# Patient Record
Sex: MALE | Race: WHITE | NOT HISPANIC OR LATINO | Employment: FULL TIME | URBAN - METROPOLITAN AREA
[De-identification: names, ages, dates, MRNs, and addresses within clinical notes are randomized per-mention and may not be internally consistent; named-entity substitution may affect disease eponyms.]

---

## 2018-07-17 PROBLEM — R20.0 NUMBNESS OF RIGHT HAND: Status: ACTIVE | Noted: 2018-07-17

## 2018-07-17 PROBLEM — M77.11 RIGHT LATERAL EPICONDYLITIS: Status: ACTIVE | Noted: 2018-07-17

## 2019-02-13 ENCOUNTER — INITIAL CONSULT (OUTPATIENT)
Dept: PHYSICAL MEDICINE AND REHAB | Facility: CLINIC | Age: 44
End: 2019-02-13
Payer: COMMERCIAL

## 2019-02-13 VITALS
DIASTOLIC BLOOD PRESSURE: 81 MMHG | HEIGHT: 69 IN | SYSTOLIC BLOOD PRESSURE: 152 MMHG | WEIGHT: 175 LBS | BODY MASS INDEX: 25.92 KG/M2 | HEART RATE: 62 BPM

## 2019-02-13 DIAGNOSIS — M77.11 RIGHT LATERAL EPICONDYLITIS: Primary | ICD-10-CM

## 2019-02-13 PROCEDURE — 99999 PR PBB SHADOW E&M-NEW PATIENT-LVL III: CPT | Mod: PBBFAC,,, | Performed by: PHYSICAL MEDICINE & REHABILITATION

## 2019-02-13 PROCEDURE — 76882 PR  US,EXTREMITY,NONVASCULAR,REAL-TIME IMAGE,LIMITED: ICD-10-PCS | Mod: S$GLB,,, | Performed by: PHYSICAL MEDICINE & REHABILITATION

## 2019-02-13 PROCEDURE — 99243 OFF/OP CNSLTJ NEW/EST LOW 30: CPT | Mod: 25,S$GLB,, | Performed by: PHYSICAL MEDICINE & REHABILITATION

## 2019-02-13 PROCEDURE — 99999 PR PBB SHADOW E&M-NEW PATIENT-LVL III: ICD-10-PCS | Mod: PBBFAC,,, | Performed by: PHYSICAL MEDICINE & REHABILITATION

## 2019-02-13 PROCEDURE — 76882 US LMTD JT/FCL EVL NVASC XTR: CPT | Mod: S$GLB,,, | Performed by: PHYSICAL MEDICINE & REHABILITATION

## 2019-02-13 PROCEDURE — 99243 PR OFFICE CONSULTATION,LEVEL III: ICD-10-PCS | Mod: 25,S$GLB,, | Performed by: PHYSICAL MEDICINE & REHABILITATION

## 2019-02-13 NOTE — PROGRESS NOTES
[facility]    Consulting Provider: Dr. Eugene Iqbal II    CHIEF COMPLAINT:   Chief Complaint   Patient presents with    Elbow Pain     right elbow pain     HISTORY OF PRESENT ILLNESS: Matty Aquino is a 43 y.o. male who presents to me for right elbow pain. He has has right elbow pain for about 1 year. He does not remember any inciting incident and truma. He has an extensive history of being very active in sports (ju jitzu, and boxing) and with his job as well. He used to work in maintenance. He had previous injection of his right elbow in 7/31/18 and 1/19/19. He also did about 6 weeks of physical therapy with benefit. The first shot lasted about 4-5 months. His shot in January 2019 has not been as affective. His pain has been progressively increasing. He takes Ibuprofen 800mg 2-3 times a week. He has issues with opening bottles. Any movement that extends the wrist causes him pain. Patient brought MRI disc that showed lateral epicondylitis, including a partial tear of the common extensor tendon origin. He wants to discuss therapeutic options or procedures that could help him avoid surgery and provide him with pain relief.    Review of Systems   Constitutional: Negative for fever.   HENT: Negative for drooling.    Eyes: Negative for discharge.   Respiratory: Negative for choking.    Cardiovascular: Negative for chest pain.   Genitourinary: Negative for flank pain.   Skin: Negative for wound.   Allergic/Immunologic: Negative for immunocompromised state.   Neurological: Negative for tremors and syncope.   Psychiatric/Behavioral: Negative for behavioral problems.     Past Medical History:   Past Medical History:   Diagnosis Date    Asthma     Kidney stone     (R) x 2       Past Surgical History:   Past Surgical History:   Procedure Laterality Date    APPENDECTOMY  1989    HAND SURGERY Right     ORIF    METACARPAL HARDWARE REMOVAL Right     TONSILLECTOMY         Family History:  "History reviewed. No pertinent family history.    Medications:   Current Outpatient Medications on File Prior to Visit   Medication Sig Dispense Refill    ibuprofen (ADVIL,MOTRIN) 800 MG tablet Take 1 tablet (800 mg total) by mouth every 8 (eight) hours as needed for Pain. 30 tablet 1    testosterone 1 % (25 mg/2.5gram) GlPk Place onto the skin every 14 (fourteen) days.       VENTOLIN HFA 90 mcg/actuation inhaler Inhale 2 puffs into the lungs 4 (four) times daily as needed.  4     No current facility-administered medications on file prior to visit.        Allergies:   Review of patient's allergies indicates:   Allergen Reactions    Pcn [penicillins]      As a child       Social History:   Social History     Socioeconomic History    Marital status:      Spouse name: None    Number of children: None    Years of education: None    Highest education level: None   Social Needs    Financial resource strain: None    Food insecurity - worry: None    Food insecurity - inability: None    Transportation needs - medical: None    Transportation needs - non-medical: None   Occupational History    None   Tobacco Use    Smoking status: Current Every Day Smoker     Packs/day: 1.00     Years: 25.00     Pack years: 25.00    Smokeless tobacco: Never Used   Substance and Sexual Activity    Alcohol use: No    Drug use: No    Sexual activity: None   Other Topics Concern    None   Social History Narrative    None     PHYSICAL EXAMINATION:   General    Vitals:    02/13/19 1105   BP: (!) 152/81   Pulse: 62   Weight: 79.4 kg (175 lb)   Height: 5' 9" (1.753 m)     Constitutional: Oriented to person, place, and time. No apparent distress. Appears well-developed and well-nourished. Pleasant.  HENT:   Head: Normocephalic and atraumatic.   Eyes: Right eye exhibits no discharge. Left eye exhibits no discharge. No scleral icterus.   Pulmonary/Chest: Effort normal. No respiratory distress.   Abdominal: There is no " guarding.   Neurological: Alert and oriented to person, place, and time.   Psychiatric: Behavior is normal.   Right Elbow Exam     Tenderness   The patient is experiencing tenderness in the lateral epicondyle.     Range of Motion   Extension: 0 (pain with terminal extension)   Flexion: 140     Muscle Strength   Pronation:  5/5   Supination:  5/5     Tests   Varus: negative (Pain with varus pressure)  Valgus: negative    Other   Erythema: absent  Scars: absent  Sensation: normal  Pulse: present    Comments:  + Cozen's test      Left Elbow Exam     Tenderness   The patient is experiencing no tenderness.     Range of Motion   Extension: normal   Flexion: normal   Pronation: normal   Supination: normal     Other   Erythema: absent  Scars: absent  Sensation: normal  Pulse: present        INSPECTION: There is no swelling, ecchymoses, erythema or gross deformity of the elbow.  LIGAMENTOUS LAXITY AND STABILITY: No laxity or instability appreciated with varus/valgus pressure, but there was lateral pain with varus.    Imaging:    EXAMINATION:  XR ELBOW COMPLETE 3 VIEW RIGHT    CLINICAL HISTORY:  . Pain in right elbow    TECHNIQUE:  AP, lateral, and oblique views of the right elbow were performed.    COMPARISON:  None    FINDINGS:  There are no osseous or articular abnormalities.  No fractures or dislocations or joint effusion.  Soft tissues are unremarkable.      Impression       As above.      Electronically signed by: Migue Tovar MD  Date: 07/17/2018  Time: 10:15     Diagnostic ultrasound exam  Limited diagnostic exam of the right lateral elbow was performed today.  The images were saved will be uploaded to EMR.  There was no lateral elbow joint effusion.  The proximal/posterior portion of the radial collateral ligament was somewhat attenuated and hypoechoic, suggestive of some partial tearing of the deep fibers.  Otherwise, the ligament appeared to be within normal limits.  The above common extensor tendon was heterogenous  "in echotexture at the proximal and anterior portions of the tendon.  There were some hypoechoic areas within the tendon and may represent some intrasubstance tearing.  Color Doppler function show a mild degree of hyperemia/neovascularization within the proximal tendon substance.  This area was tender to sonopalpation.    Data Reviewed: X-ray, ulrasound    Supportive Actions: Independent visualization of images or test specimens    ASSESSMENT:   Lateral epicondylitis, right    PLAN:     1. Time was spent reviewing the above diagnosis in depth with Matty gomez, including acute management and rehabilitation. Therapeutic and interventional options were discussed with the patient. He understood his options; conservative to more aggressive.  He has failed conservative management in the form of NSAIDs, rest, physical therapy, and corticosteroid injections.  Discussed PRP, Stem cells, and Tenex procedure. Patient understood the procedure including risks and benefits. He wants to proceed with the Tenex procedure.  We discussed that the presence of some tendon tearing does lower the prognosis, however he does have pathologic degenerative tendon tissue that we could address with the Tenex device.  We discussed the likelihood of increased pain and soreness following the Tenex procedure followed by gradual course of improvement for several weeks to months.  We discussed that his activities will be limited, especially for the 1st 2 weeks following the procedure followed by gradual course of increased use using pain as a guide.  We discussed the need for further interventions despite the use of Tenex, such as platelet rich plasma.  He voiced understanding wishes to proceed.    2. Patient scheduled for Tenex procedure to the right common extensor tendon.    This is a consult from Dr. Eugene Iqbal II. Please see the "Communications" section of Epic to see how the consulting physician received the report of today's " "findings and recommendations. If it's an [facility] physician, it will be forwarded to his/her "in basket".    The above note was completed, in part, with the aid of Dragon dictation software/hardware. Translation errors may be present.    "

## 2019-02-13 NOTE — LETTER
February 13, 2019      [place]  [id number] [address] [facility]  [facility] [address]           [facility]  [address] [facility] [address]  Phone: [phone]  Fax: [phone]          Patient: Matty Aquino   MR Number: [id number]   YOB: 1975   Date of Visit: 2/13/2019       Dear Dr. Eugene Iqbal II:    Thank you for referring Matty Aquino to me for evaluation. Attached you will find relevant portions of my assessment and plan of care.    If you have questions, please do not hesitate to call me. I look forward to following Matty Aquino along with you.    Sincerely,    Quintin Yates MD    Enclosure  CC:  No Recipients    If you would like to receive this communication electronically, please contact [email or web address] or [phone] to request more information on [facility] Link access.    For providers and/or their staff who would like to refer a patient to [facility], please contact us through our one-stop-shop provider referral line, [facility], at [phone].    If you feel you have received this communication in error or would no longer like to receive these types of communications, please e-mail [email or web address]

## 2019-02-14 ENCOUNTER — TELEPHONE (OUTPATIENT)
Dept: PHYSICAL MEDICINE AND REHAB | Facility: CLINIC | Age: 44
End: 2019-02-14

## 2019-02-14 NOTE — TELEPHONE ENCOUNTER
----- Message from Angeli Ansari sent at 2/14/2019  8:49 AM CST -----  Contact: self  Self [phone] is calling to reschedule his procedure that is scheduled for 03 15 19/please call

## 2019-02-20 DIAGNOSIS — M77.11 RIGHT LATERAL EPICONDYLITIS: Primary | ICD-10-CM

## 2019-02-20 RX ORDER — MIDAZOLAM HYDROCHLORIDE 5 MG/ML
2 INJECTION INTRAMUSCULAR; INTRAVENOUS ONCE AS NEEDED
Status: CANCELLED | OUTPATIENT
Start: 2019-02-20 | End: 2030-07-19

## 2019-02-20 RX ORDER — LIDOCAINE HYDROCHLORIDE 10 MG/ML
1 INJECTION, SOLUTION EPIDURAL; INFILTRATION; INTRACAUDAL; PERINEURAL ONCE
Status: CANCELLED | OUTPATIENT
Start: 2019-02-20 | End: 2019-02-20

## 2019-02-20 RX ORDER — SODIUM CHLORIDE, SODIUM LACTATE, POTASSIUM CHLORIDE, CALCIUM CHLORIDE 600; 310; 30; 20 MG/100ML; MG/100ML; MG/100ML; MG/100ML
INJECTION, SOLUTION INTRAVENOUS CONTINUOUS
Status: CANCELLED | OUTPATIENT
Start: 2019-02-20

## 2019-03-29 ENCOUNTER — HOSPITAL ENCOUNTER (OUTPATIENT)
Facility: HOSPITAL | Age: 44
Discharge: HOME | End: 2019-03-29
Attending: PHYSICAL MEDICINE & REHABILITATION | Admitting: PHYSICAL MEDICINE & REHABILITATION
Payer: COMMERCIAL

## 2019-03-29 VITALS
TEMPERATURE: 97 F | HEART RATE: 68 BPM | BODY MASS INDEX: 25.93 KG/M2 | WEIGHT: 175.06 LBS | RESPIRATION RATE: 18 BRPM | HEIGHT: 69 IN | OXYGEN SATURATION: 97 % | DIASTOLIC BLOOD PRESSURE: 74 MMHG | SYSTOLIC BLOOD PRESSURE: 132 MMHG

## 2019-03-29 DIAGNOSIS — M77.11 RIGHT LATERAL EPICONDYLITIS: ICD-10-CM

## 2019-03-29 PROCEDURE — 24357 PR TENOTOMY ELBOW LATERAL/MEDIAL PERCUTANEOUS: ICD-10-PCS | Mod: RT,,, | Performed by: PHYSICAL MEDICINE & REHABILITATION

## 2019-03-29 PROCEDURE — 76942 ECHO GUIDE FOR BIOPSY: CPT | Mod: 26,,, | Performed by: PHYSICAL MEDICINE & REHABILITATION

## 2019-03-29 PROCEDURE — 76942 PR U/S GUIDANCE FOR NEEDLE GUIDANCE: ICD-10-PCS | Mod: 26,,, | Performed by: PHYSICAL MEDICINE & REHABILITATION

## 2019-03-29 PROCEDURE — 25000003 PHARM REV CODE 250: Mod: PO | Performed by: PHYSICAL MEDICINE & REHABILITATION

## 2019-03-29 PROCEDURE — 36000705 HC OR TIME LEV I EA ADD 15 MIN: Mod: PO | Performed by: PHYSICAL MEDICINE & REHABILITATION

## 2019-03-29 PROCEDURE — 71000015 HC POSTOP RECOV 1ST HR: Mod: PO | Performed by: PHYSICAL MEDICINE & REHABILITATION

## 2019-03-29 PROCEDURE — 24357 REPAIR ELBOW PERC: CPT | Mod: RT,,, | Performed by: PHYSICAL MEDICINE & REHABILITATION

## 2019-03-29 PROCEDURE — 36000704 HC OR TIME LEV I 1ST 15 MIN: Mod: PO | Performed by: PHYSICAL MEDICINE & REHABILITATION

## 2019-03-29 RX ORDER — TRAMADOL HYDROCHLORIDE 50 MG/1
50 TABLET ORAL EVERY 4 HOURS PRN
Qty: 8 TABLET | Refills: 0 | Status: SHIPPED | OUTPATIENT
Start: 2019-03-29 | End: 2020-01-20

## 2019-03-29 RX ORDER — SODIUM CHLORIDE, SODIUM LACTATE, POTASSIUM CHLORIDE, CALCIUM CHLORIDE 600; 310; 30; 20 MG/100ML; MG/100ML; MG/100ML; MG/100ML
INJECTION, SOLUTION INTRAVENOUS CONTINUOUS
Status: DISCONTINUED | OUTPATIENT
Start: 2019-03-29 | End: 2019-03-29 | Stop reason: HOSPADM

## 2019-03-29 RX ORDER — LIDOCAINE HYDROCHLORIDE 10 MG/ML
1 INJECTION, SOLUTION EPIDURAL; INFILTRATION; INTRACAUDAL; PERINEURAL ONCE
Status: DISCONTINUED | OUTPATIENT
Start: 2019-03-29 | End: 2019-03-29 | Stop reason: HOSPADM

## 2019-03-29 RX ORDER — LIDOCAINE HYDROCHLORIDE 10 MG/ML
INJECTION INFILTRATION; PERINEURAL
Status: DISCONTINUED | OUTPATIENT
Start: 2019-03-29 | End: 2019-03-29 | Stop reason: HOSPADM

## 2019-03-29 RX ORDER — MIDAZOLAM HYDROCHLORIDE 1 MG/ML
2 INJECTION INTRAMUSCULAR; INTRAVENOUS ONCE AS NEEDED
Status: DISCONTINUED | OUTPATIENT
Start: 2019-03-29 | End: 2019-03-29 | Stop reason: HOSPADM

## 2019-03-29 RX ADMIN — SODIUM CHLORIDE, SODIUM LACTATE, POTASSIUM CHLORIDE, AND CALCIUM CHLORIDE: .6; .31; .03; .02 INJECTION, SOLUTION INTRAVENOUS at 12:03

## 2019-03-29 NOTE — DISCHARGE INSTRUCTIONS
Tenex Elbow    General Instructions   Keep bandages and procedure area clean and dry    Remove dressing in 48 hours   May appply ice for 20 minutes off/on as needed for discomfort; heat after 48 hours   Take over the counter pain medication (tylenol) as directed by the    Avoid ibuprofen, advilm motrin, aleve or naproxen   Avoid submerging the are in water (i.e. Swimmin/bathin/hot tube) for 5 days     Plantar Fascia   Rest elbow today   No lifting more than a coffee cup with the effected arm for 2 weeks   Start daily gentle, non-painful range of motion exercise on 3rd day   Sedentary body activity for 3 weeks   Follow up in 2-3 weeks     Contact office at  [phone]   If area becomes red or hot to touch   For increased pain or swelling   Drainage from the site

## 2019-03-29 NOTE — H&P
CHIEF COMPLAINT:        Chief Complaint   Patient presents with    Elbow Pain       right elbow pain      HISTORY OF PRESENT ILLNESS: Matty Aquino is a 43 y.o. male who presents to me for elective Tenex procedure to the right common extensor tendon.     Review of Systems   Constitutional: Negative for fever.   HENT: Negative for drooling.    Eyes: Negative for discharge.   Respiratory: Negative for choking.    Cardiovascular: Negative for chest pain.   Genitourinary: Negative for flank pain.   Skin: Negative for wound.   Allergic/Immunologic: Negative for immunocompromised state.   Neurological: Negative for tremors and syncope.   Psychiatric/Behavioral: Negative for behavioral problems.      Past Medical History:        Past Medical History:   Diagnosis Date    Asthma      Kidney stone       (R) x 2         Past Surgical History:         Past Surgical History:   Procedure Laterality Date    APPENDECTOMY   1989    HAND SURGERY Right       ORIF    METACARPAL HARDWARE REMOVAL Right      TONSILLECTOMY             Family History: History reviewed. No pertinent family history.     Medications:          Current Outpatient Medications on File Prior to Visit   Medication Sig Dispense Refill    ibuprofen (ADVIL,MOTRIN) 800 MG tablet Take 1 tablet (800 mg total) by mouth every 8 (eight) hours as needed for Pain. 30 tablet 1    testosterone 1 % (25 mg/2.5gram) GlPk Place onto the skin every 14 (fourteen) days.         VENTOLIN HFA 90 mcg/actuation inhaler Inhale 2 puffs into the lungs 4 (four) times daily as needed.   4      No current facility-administered medications on file prior to visit.          Allergies:         Review of patient's allergies indicates:   Allergen Reactions    Pcn [penicillins]         As a child         Social History:   Social History            Socioeconomic History    Marital status:        Spouse name: None    Number of children: None    Years of education: None     Highest education level: None   Social Needs    Financial resource strain: None    Food insecurity - worry: None    Food insecurity - inability: None    Transportation needs - medical: None    Transportation needs - non-medical: None   Occupational History    None   Tobacco Use    Smoking status: Current Every Day Smoker       Packs/day: 1.00       Years: 25.00       Pack years: 25.00    Smokeless tobacco: Never Used   Substance and Sexual Activity    Alcohol use: No    Drug use: No    Sexual activity: None   Other Topics Concern    None   Social History Narrative    None      PHYSICAL EXAMINATION:   General           VSS   Constitutional: Oriented to person, place, and time. No apparent distress. Appears well-developed and well-nourished. Pleasant.  HENT:   Head: Normocephalic and atraumatic.   Eyes: Right eye exhibits no discharge. Left eye exhibits no discharge. No scleral icterus.   Pulmonary/Chest: Effort normal. No respiratory distress.   Abdominal: There is no guarding.   Neurological: Alert and oriented to person, place, and time.   Psychiatric: Behavior is normal.   Right Elbow Exam      Tenderness   The patient is experiencing tenderness in the lateral epicondyle.      Range of Motion   Extension: 0 (pain with terminal extension)   Flexion: 140      Muscle Strength   Pronation:  5/5   Supination:  5/5      Tests   Varus: negative (Pain with varus pressure)  Valgus: negative     Other   Erythema: absent  Scars: absent  Sensation: normal  Pulse: present     Comments:  + Cozen's test        Left Elbow Exam      Tenderness   The patient is experiencing no tenderness.      Range of Motion   Extension: normal   Flexion: normal   Pronation: normal   Supination: normal      Other   Erythema: absent  Scars: absent  Sensation: normal  Pulse: present           INSPECTION: There is no swelling, ecchymoses, erythema or gross deformity of the elbow.  LIGAMENTOUS LAXITY AND STABILITY: No laxity or  instability appreciated with varus/valgus pressure, but there was lateral pain with varus.     Imaging:        EXAMINATION:  XR ELBOW COMPLETE 3 VIEW RIGHT    CLINICAL HISTORY:  . Pain in right elbow    FINDINGS:  There are no osseous or articular abnormalities.  No fractures or dislocations or joint effusion.  Soft tissues are unremarkable.                     Diagnostic ultrasound exam  Limited diagnostic exam of the right lateral elbow: There was no lateral elbow joint effusion.  The proximal/posterior portion of the radial collateral ligament was somewhat attenuated and hypoechoic, suggestive of some partial tearing of the deep fibers.  Otherwise, the ligament appeared to be within normal limits.  The above common extensor tendon was heterogenous in echotexture at the proximal and anterior portions of the tendon.  There were some hypoechoic areas within the tendon and may represent some intrasubstance tearing.  Color Doppler function show a mild degree of hyperemia/neovascularization within the proximal tendon substance.  This area was tender to sonopalpation.     Data Reviewed: X-ray, ulrasound     Supportive Actions: Independent visualization of images or test specimens     ASSESSMENT:   Lateral epicondylitis, right     PLAN:      1. We will proceed today with right common extensor tendon Tenex procedure.     2. Patient will f/u in clinic in 2 weeks.

## 2019-03-29 NOTE — PLAN OF CARE
Vss, madonna po fluids, denies pain, ambulates easily. IV removed, catheter intact. Discharge instructions provided and states understanding. States ready to go home.  Discharged from facility with family per wheelchair.

## 2019-03-29 NOTE — OP NOTE
Operative Note       Surgery Date: 3/29/2019     Surgeon(s) and Role:     * Quintin Yates MD - Primary    Pre-op Diagnosis:  Right lateral epicondylitis [M77.11]    Post-op Diagnosis: Post-Op Diagnosis Codes:     * Right lateral epicondylitis [M77.11]    Procedure(s) (LRB):  ultrasonic percutaneous tenotomy of the right common extensor tendon (Right)    Anesthesia: RN IV Sedation    Procedure in Detail/Findings:    Indications:       This is a 43 y.o. male with recalcitrant right common extensor tendon tendinosis who presents for elective percutaneous tenotomy of the right elbow. We discussed the risk, benefits and alternatives, and he elected to proceed.       Description of Procedure:       Once he was brought to the operating room, a time-out was performed confirming the name, the location and the procedure. The patient was the patient was placed in the supine position with the right elbow flexed to about 40 degrees. The skin overlying the right common extensor tendon was prepped using a ChloraPrep solution. Using ultrasound, the proximal common extensor tendon was observed to be hypoechoic, hyperemic, and heterogenous, consistent with a diagnosis of lateral epicondylosis. There was also a hypoechoic defect in the deep tendon substance that represents a tear. After diffuse administration of lidocaine 1%, a #11 blade was used to make a small incision. A 18g angiocath was then used to create a tract for the Tenex device. The TX1 handpiece was then introduced down to the tendon defect. With 1 minute and 58 seconds of energy time, the tendon defect was addressed with the Tenex device. The Tenex hand piece was then removed. A sterile compression dressing was applied with an ACE wrap. The patient was returned to the recovery area in good condition. He was instructed to not lift more than 5 lbs until cleared by me. We will follow up with him in two weeks in the clinic to discuss postoperative protocol.       Estimated Blood Loss: Minimal           Specimens (From admission, onward)    None                          Condition: Good    Attestation:  I performed the procedure.           Discharge Note    Admit Date: 3/29/2019    Attending Physician: Quintin Yates MD     Discharge Physician: Quintin Yates MD    Final Diagnosis: Post-Op Diagnosis Codes:     * Right lateral epicondylitis [M77.11]    Disposition: Home or Self Care, pt discharged and will f/u in clinic in 2 weeks.    Patient Instructions:   Current Discharge Medication List      CONTINUE these medications which have NOT CHANGED    Details   ibuprofen (ADVIL,MOTRIN) 800 MG tablet Take 1 tablet (800 mg total) by mouth every 8 (eight) hours as needed for Pain.  Qty: 30 tablet, Refills: 1    Associated Diagnoses: Right lateral epicondylitis      testosterone 1 % (25 mg/2.5gram) GlPk Place onto the skin every 14 (fourteen) days.       VENTOLIN HFA 90 mcg/actuation inhaler Inhale 2 puffs into the lungs 4 (four) times daily as needed.  Refills: 4             Discharge Procedure Orders (must include Diet, Follow-up, Activity)   Discharge Procedure Orders (must include Diet, Follow-up, Activity)   Diet general     Ice to affected area     Call MD for:  temperature >100.4     Call MD for:  persistent nausea and vomiting     Call MD for:  severe uncontrolled pain     Call MD for:  difficulty breathing, headache or visual disturbances     Call MD for:  redness, tenderness, or signs of infection (pain, swelling, redness, odor or green/yellow discharge around incision site)     Call MD for:  hives     Call MD for:  persistent dizziness or light-headedness     Call MD for:  extreme fatigue     Remove dressing in 48 hours     Shower on day dressing removed (No bath)        Discharge Date: 3/29/19

## 2019-04-05 ENCOUNTER — TELEPHONE (OUTPATIENT)
Dept: PHYSICAL MEDICINE AND REHAB | Facility: CLINIC | Age: 44
End: 2019-04-05

## 2019-04-05 NOTE — TELEPHONE ENCOUNTER
----- Message from Vy Faulkner sent at 4/5/2019  3:40 PM CDT -----  Type:   Appointment Request    Caller is requesting  appointment.    Name of Caller:  Nabila Aquino   When is the first available appointment?  04/23   Symptoms:  Had a procedure 03/29 / was advised 2 week follow up ?   Best Call Back Number:  [phone]  Additional Information:   Requesting to know if 04/23 will be ok ?

## 2019-04-17 ENCOUNTER — OFFICE VISIT (OUTPATIENT)
Dept: PHYSICAL MEDICINE AND REHAB | Facility: CLINIC | Age: 44
End: 2019-04-17
Payer: COMMERCIAL

## 2019-04-17 VITALS — BODY MASS INDEX: 25.92 KG/M2 | WEIGHT: 175 LBS | HEIGHT: 69 IN

## 2019-04-17 DIAGNOSIS — M77.11 RIGHT LATERAL EPICONDYLITIS: Primary | ICD-10-CM

## 2019-04-17 PROCEDURE — 99024 POSTOP FOLLOW-UP VISIT: CPT | Mod: S$GLB,,, | Performed by: PHYSICAL MEDICINE & REHABILITATION

## 2019-04-17 PROCEDURE — 99999 PR PBB SHADOW E&M-EST. PATIENT-LVL II: ICD-10-PCS | Mod: PBBFAC,,, | Performed by: PHYSICAL MEDICINE & REHABILITATION

## 2019-04-17 PROCEDURE — 99999 PR PBB SHADOW E&M-EST. PATIENT-LVL II: CPT | Mod: PBBFAC,,, | Performed by: PHYSICAL MEDICINE & REHABILITATION

## 2019-04-17 PROCEDURE — 99024 PR POST-OP FOLLOW-UP VISIT: ICD-10-PCS | Mod: S$GLB,,, | Performed by: PHYSICAL MEDICINE & REHABILITATION

## 2019-04-17 RX ORDER — PRAVASTATIN SODIUM 40 MG/1
TABLET ORAL
Refills: 0 | COMMUNITY
Start: 2019-03-21 | End: 2020-01-20

## 2019-04-17 NOTE — PROGRESS NOTES
[facility]    CHIEF COMPLAINT:   Chief Complaint   Patient presents with    Follow-up     tenex 3/29/2019 elbow     HISTORY OF PRESENT ILLNESS: Matty Aquino is a 43 y.o. male who presents to me for follow up of right lateral epicondylitis s/p Tenex procedure 3/29/19. He tolerated the procedure well and has not had any complications. He did not have any significant swelling or pain. He continues to have stiffness and has continued to limit his activity as recommended. He has not taken any pain medication or NSAIDs. He has not been picking up any heavy objects; only coffee cup. He has been stretching as advised. He stretches every morning when he wakes up because this is when his elbow is the stiffest. Denies any issues or complaints.     Review of Systems   Constitutional: Negative for fever.   HENT: Negative for drooling.    Eyes: Negative for discharge.   Respiratory: Negative for choking.    Cardiovascular: Negative for chest pain.   Genitourinary: Negative for flank pain.   Skin: Negative for wound.   Allergic/Immunologic: Negative for immunocompromised state.   Neurological: Negative for tremors and syncope.   Psychiatric/Behavioral: Negative for behavioral problems.     Past Medical History:   Past Medical History:   Diagnosis Date    Asthma     Kidney stone     (R) x 2       Past Surgical History:   Past Surgical History:   Procedure Laterality Date    APPENDECTOMY  1989    HAND SURGERY Right     ORIF    METACARPAL HARDWARE REMOVAL Right     SKIN BIOPSY      x2    TONSILLECTOMY      ultrasonic percutaneous tenotomy of the right common extensor tendon Right 3/29/2019    Performed by Quintin Yates MD at [facility]       Family History: History reviewed. No pertinent family history.    Medications:   Current Outpatient Medications on File Prior to Visit   Medication Sig Dispense Refill    ibuprofen (ADVIL,MOTRIN) 800 MG tablet Take 1 tablet (800 mg total) by mouth every  8 (eight) hours as needed for Pain. 30 tablet 1    pravastatin (PRAVACHOL) 40 MG tablet TAKE 1 BY MOUTH DAILY EVERY EVENING.  0    testosterone 1 % (25 mg/2.5gram) GlPk Place onto the skin every 14 (fourteen) days.       traMADol (ULTRAM) 50 mg tablet Take 1 tablet (50 mg total) by mouth every 4 (four) hours as needed for Pain. 8 tablet 0    VENTOLIN HFA 90 mcg/actuation inhaler Inhale 2 puffs into the lungs 4 (four) times daily as needed.  4     No current facility-administered medications on file prior to visit.        Allergies:   Review of patient's allergies indicates:   Allergen Reactions    Pcn [penicillins]      As a child       Social History:   Social History     Socioeconomic History    Marital status:      Spouse name: Not on file    Number of children: Not on file    Years of education: Not on file    Highest education level: Not on file   Occupational History    Not on file   Social Needs    Financial resource strain: Not on file    Food insecurity:     Worry: Not on file     Inability: Not on file    Transportation needs:     Medical: Not on file     Non-medical: Not on file   Tobacco Use    Smoking status: Current Every Day Smoker     Packs/day: 1.00     Years: 25.00     Pack years: 25.00    Smokeless tobacco: Never Used   Substance and Sexual Activity    Alcohol use: No    Drug use: No    Sexual activity: Not on file   Lifestyle    Physical activity:     Days per week: Not on file     Minutes per session: Not on file    Stress: Not on file   Relationships    Social connections:     Talks on phone: Not on file     Gets together: Not on file     Attends Confucianism service: Not on file     Active member of club or organization: Not on file     Attends meetings of clubs or organizations: Not on file     Relationship status: Not on file   Other Topics Concern    Not on file   Social History Narrative    Not on file     PHYSICAL EXAMINATION:   General    Vitals:    04/17/19  "0804   Weight: 79.4 kg (175 lb)   Height: 5' 9" (1.753 m)     Constitutional: Oriented to person, place, and time. No apparent distress. Appears well-developed and well-nourished. Pleasant.  HENT:   Head: Normocephalic and atraumatic.   Eyes: Right eye exhibits no discharge. Left eye exhibits no discharge. No scleral icterus.   Pulmonary/Chest: Effort normal. No respiratory distress.   Abdominal: There is no guarding.   Neurological: Alert and oriented to person, place, and time.   Psychiatric: Behavior is normal.   Right Elbow Exam     Tenderness   The patient is experiencing tenderness in the lateral epicondyle.     Range of Motion   Extension: 0   Flexion: 140   Pronation: normal   Supination: normal     Muscle Strength   Right elbow normal strength: Exam limited to pain; patient denies increase in pain since procedure.  Pronation:  5/5   Supination:  5/5     Tests   Varus: negative  Valgus: negative    Other   Erythema: absent  Scars: present (0.5 cm incision well healed at right elbow)  Sensation: normal    Comments:  No swelling appreciated  Well healed incision        INSPECTION: There is no swelling, ecchymoses, erythema or gross deformity about the elbow.  LIGAMENTOUS LAXITY AND STABILITY: None appreciated at elbow with varus and valgus stress    ASSESSMENT: Right Lateral epicondylitis s/p Tenex procedure 3/29/19    PLAN:     1. Mr. Sears is reporting some overall improvement since the procedure but is still having some degree of pain.  We discussed that this is to be expected at this point post Tenex.  We discussed that I expect him to continue to recover improved gradually over the next several weeks.  Patient instructed to perform stretches daily at elbow/wrist and to increase activity slowly as tolerated. Patient instructed to not progress activity too quickly to avoid aggravation and overuse to his healing tissues.    2. Will call patient in 2 weeks to reassess progress. Consider PRP if he has not " made progress. Patient voiced understanding of plan.    The above note was completed, in part, with the aid of Dragon dictation software/hardware. Translation errors may be present.

## 2019-05-01 ENCOUNTER — TELEPHONE (OUTPATIENT)
Dept: PHYSICAL MEDICINE AND REHAB | Facility: CLINIC | Age: 44
End: 2019-05-01

## 2019-05-01 NOTE — TELEPHONE ENCOUNTER
Spoke with wife who had several questions about the PRP procedure for her . I answered what I could and offered to ask Dr Yates the rest. But she still had a lot of questions. So I suggested that they use his PRP appt as a consult to get their questions answered so we are all on the same page going forward and we can schedule his PRP anytime after that if that is the route they want to go

## 2019-05-01 NOTE — TELEPHONE ENCOUNTER
Patient is set up to have prp on may 15th but is in pain and would like to know if he can have a steroid inj before then for the pain ?

## 2019-05-01 NOTE — TELEPHONE ENCOUNTER
Patient states that he has not had any improvement he would like to do the platelet inj- scheduled with patient

## 2019-05-01 NOTE — TELEPHONE ENCOUNTER
----- Message from Naomy Barrett sent at 5/1/2019  1:33 PM CDT -----  Type: Needs Medical Advice    Who Called:  Nabila Aquino - spouse  Best Call Back Number: [phone]  Additional Information: spouse states procedure did not work, and would like to discuss what patient should do next.    Thank you

## 2019-05-01 NOTE — TELEPHONE ENCOUNTER
Left msg on machine for patient that I was calling to check on him after his last visit with dr aranda

## 2019-05-15 ENCOUNTER — TELEPHONE (OUTPATIENT)
Dept: PHYSICAL MEDICINE AND REHAB | Facility: CLINIC | Age: 44
End: 2019-05-15

## 2019-05-16 NOTE — TELEPHONE ENCOUNTER
----- Message from Elena Nathan sent at 5/15/2019  4:10 PM CDT -----  Contact: Wife Nabila  Patients wife is requesting a call back to get the elbow (blood plattlets) injection procedure scheduled, that the patient thought he was having today.  Call back Nabila at [phone].  Thanks

## 2019-05-23 ENCOUNTER — TELEPHONE (OUTPATIENT)
Dept: PHYSICAL MEDICINE AND REHAB | Facility: CLINIC | Age: 44
End: 2019-05-23

## 2019-05-23 NOTE — TELEPHONE ENCOUNTER
----- Message from Cristy Regalado sent at 5/23/2019  2:25 PM CDT -----  Contact: WIfe  Type: Needs Medical Advice    Who Called:  Wife    Best Call Back Number: Nabila  Additional Information: would like a call back from the nurse today please need to speak to Dr about patients arm he is in a lot of pain wants to inquire about a cortisone shot or something else asap

## 2019-12-10 PROBLEM — M77.11 RIGHT LATERAL EPICONDYLITIS: Status: RESOLVED | Noted: 2018-07-17 | Resolved: 2019-12-10

## 2019-12-10 PROBLEM — R20.0 NUMBNESS OF RIGHT HAND: Status: RESOLVED | Noted: 2018-07-17 | Resolved: 2019-12-10

## 2020-01-07 ENCOUNTER — LAB VISIT (OUTPATIENT)
Dept: LAB | Facility: HOSPITAL | Age: 45
End: 2020-01-07
Attending: INTERNAL MEDICINE
Payer: COMMERCIAL

## 2020-01-07 ENCOUNTER — INITIAL CONSULT (OUTPATIENT)
Dept: HEMATOLOGY/ONCOLOGY | Facility: CLINIC | Age: 45
End: 2020-01-07
Payer: COMMERCIAL

## 2020-01-07 VITALS
HEIGHT: 69 IN | BODY MASS INDEX: 25.08 KG/M2 | HEART RATE: 72 BPM | RESPIRATION RATE: 18 BRPM | DIASTOLIC BLOOD PRESSURE: 83 MMHG | WEIGHT: 169.31 LBS | TEMPERATURE: 98 F | OXYGEN SATURATION: 96 % | SYSTOLIC BLOOD PRESSURE: 140 MMHG

## 2020-01-07 DIAGNOSIS — Z72.0 TOBACCO ABUSE: ICD-10-CM

## 2020-01-07 DIAGNOSIS — R63.4 WEIGHT LOSS, ABNORMAL: ICD-10-CM

## 2020-01-07 DIAGNOSIS — R61 NIGHT SWEATS: ICD-10-CM

## 2020-01-07 DIAGNOSIS — C81.90 HODGKIN LYMPHOMA, UNSPECIFIED HODGKIN LYMPHOMA TYPE, UNSPECIFIED BODY REGION: Primary | ICD-10-CM

## 2020-01-07 DIAGNOSIS — C81.90 HODGKIN LYMPHOMA, UNSPECIFIED HODGKIN LYMPHOMA TYPE, UNSPECIFIED BODY REGION: ICD-10-CM

## 2020-01-07 DIAGNOSIS — R53.83 FATIGUE, UNSPECIFIED TYPE: ICD-10-CM

## 2020-01-07 DIAGNOSIS — R45.89 ANXIETY ABOUT HEALTH: ICD-10-CM

## 2020-01-07 DIAGNOSIS — Z91.89 AT RISK FOR ABUSE OF OPIATES: ICD-10-CM

## 2020-01-07 PROBLEM — F41.8 ANXIETY ABOUT HEALTH: Status: ACTIVE | Noted: 2020-01-07

## 2020-01-07 LAB
ALBUMIN SERPL BCP-MCNC: 5 G/DL (ref 3.5–5.2)
ALP SERPL-CCNC: 68 U/L (ref 38–145)
ALT SERPL W/O P-5'-P-CCNC: 36 U/L (ref 10–44)
ANION GAP SERPL CALC-SCNC: 7 MMOL/L (ref 8–16)
AST SERPL-CCNC: 34 U/L (ref 17–59)
BASOPHILS # BLD AUTO: 0.04 K/UL (ref 0–0.2)
BASOPHILS NFR BLD: 0.5 % (ref 0–1.9)
BILIRUB SERPL-MCNC: 0.5 MG/DL (ref 0.2–1.3)
BUN SERPL-MCNC: 18 MG/DL (ref 9–21)
CALCIUM SERPL-MCNC: 9.6 MG/DL (ref 8.4–10.2)
CHLORIDE SERPL-SCNC: 104 MMOL/L (ref 95–110)
CO2 SERPL-SCNC: 28 MMOL/L (ref 22–31)
CREAT SERPL-MCNC: 0.93 MG/DL (ref 0.5–1.4)
DIFFERENTIAL METHOD: ABNORMAL
EOSINOPHIL # BLD AUTO: 0.1 K/UL (ref 0–0.5)
EOSINOPHIL NFR BLD: 0.8 % (ref 0–8)
ERYTHROCYTE [DISTWIDTH] IN BLOOD BY AUTOMATED COUNT: 12.7 % (ref 11.5–14.5)
ERYTHROCYTE [SEDIMENTATION RATE] IN BLOOD BY PHOTOMETRIC METHOD: 6 MM/HR (ref 0–14)
EST. GFR  (AFRICAN AMERICAN): >60 ML/MIN/1.73 M^2
EST. GFR  (NON AFRICAN AMERICAN): >60 ML/MIN/1.73 M^2
GLUCOSE SERPL-MCNC: 98 MG/DL (ref 70–110)
HAV IGM SERPL QL IA: NEGATIVE
HBV CORE IGM SERPL QL IA: NEGATIVE
HBV SURFACE AG SERPL QL IA: NEGATIVE
HCT VFR BLD AUTO: 48.7 % (ref 40–54)
HCV AB SERPL QL IA: NEGATIVE
HGB BLD-MCNC: 16.6 G/DL (ref 14–18)
IMM GRANULOCYTES # BLD AUTO: 0.04 K/UL (ref 0–0.04)
IMM GRANULOCYTES NFR BLD AUTO: 0.5 % (ref 0–0.5)
LDH SERPL L TO P-CCNC: 447 U/L (ref 313–618)
LYMPHOCYTES # BLD AUTO: 1.6 K/UL (ref 1–4.8)
LYMPHOCYTES NFR BLD: 18.6 % (ref 18–48)
MCH RBC QN AUTO: 32.4 PG (ref 27–31)
MCHC RBC AUTO-ENTMCNC: 34.1 G/DL (ref 32–36)
MCV RBC AUTO: 95 FL (ref 82–98)
MONOCYTES # BLD AUTO: 0.7 K/UL (ref 0.3–1)
MONOCYTES NFR BLD: 7.8 % (ref 4–15)
NEUTROPHILS # BLD AUTO: 6.2 K/UL (ref 1.8–7.7)
NEUTROPHILS NFR BLD: 71.8 % (ref 38–73)
NRBC BLD-RTO: 0 /100 WBC
PLATELET # BLD AUTO: 223 K/UL (ref 150–350)
PMV BLD AUTO: 10.2 FL (ref 9.2–12.9)
POTASSIUM SERPL-SCNC: 4.8 MMOL/L (ref 3.5–5.1)
PROT SERPL-MCNC: 8.2 G/DL (ref 6–8.4)
RBC # BLD AUTO: 5.12 M/UL (ref 4.6–6.2)
SODIUM SERPL-SCNC: 139 MMOL/L (ref 136–145)
WBC # BLD AUTO: 8.69 K/UL (ref 3.9–12.7)

## 2020-01-07 PROCEDURE — 85652 RBC SED RATE AUTOMATED: CPT

## 2020-01-07 PROCEDURE — 83615 LACTATE (LD) (LDH) ENZYME: CPT

## 2020-01-07 PROCEDURE — 85025 COMPLETE CBC W/AUTO DIFF WBC: CPT

## 2020-01-07 PROCEDURE — 80074 ACUTE HEPATITIS PANEL: CPT | Mod: PN

## 2020-01-07 PROCEDURE — 99999 PR PBB SHADOW E&M-EST. PATIENT-LVL V: CPT | Mod: PBBFAC,,, | Performed by: INTERNAL MEDICINE

## 2020-01-07 PROCEDURE — 80074 ACUTE HEPATITIS PANEL: CPT

## 2020-01-07 PROCEDURE — 3008F BODY MASS INDEX DOCD: CPT | Mod: CPTII,S$GLB,, | Performed by: INTERNAL MEDICINE

## 2020-01-07 PROCEDURE — 3008F PR BODY MASS INDEX (BMI) DOCUMENTED: ICD-10-PCS | Mod: CPTII,S$GLB,, | Performed by: INTERNAL MEDICINE

## 2020-01-07 PROCEDURE — 36415 COLL VENOUS BLD VENIPUNCTURE: CPT | Mod: PN

## 2020-01-07 PROCEDURE — 80053 COMPREHEN METABOLIC PANEL: CPT

## 2020-01-07 PROCEDURE — 83615 LACTATE (LD) (LDH) ENZYME: CPT | Mod: PN

## 2020-01-07 PROCEDURE — 80053 COMPREHEN METABOLIC PANEL: CPT | Mod: PN

## 2020-01-07 PROCEDURE — 86703 HIV-1/HIV-2 1 RESULT ANTBDY: CPT

## 2020-01-07 PROCEDURE — 85652 RBC SED RATE AUTOMATED: CPT | Mod: PN

## 2020-01-07 PROCEDURE — 99205 PR OFFICE/OUTPT VISIT, NEW, LEVL V, 60-74 MIN: ICD-10-PCS | Mod: S$GLB,,, | Performed by: INTERNAL MEDICINE

## 2020-01-07 PROCEDURE — 99999 PR PBB SHADOW E&M-EST. PATIENT-LVL V: ICD-10-PCS | Mod: PBBFAC,,, | Performed by: INTERNAL MEDICINE

## 2020-01-07 PROCEDURE — 85025 COMPLETE CBC W/AUTO DIFF WBC: CPT | Mod: PN

## 2020-01-07 PROCEDURE — 99205 OFFICE O/P NEW HI 60 MIN: CPT | Mod: S$GLB,,, | Performed by: INTERNAL MEDICINE

## 2020-01-07 NOTE — PROGRESS NOTES
"  INITIAL CONSULTATION     DATE: 01/07/2020  Patient Name: Matty Aquino Sr.  Referred by: Mariel Sidhu MD  Reason for referral:  History of Hodgkin lymphoma with worsening symptoms      Subjective:       Patient ID: Matty Aquino Sr. is a 44 y.o. male.    Chief Complaint: Results    HPI    8/4/2006 - cervical lymph node biopsy-   Reactive hyperplasia of cervical lymph node   Addendum summary comment:  1 slide with features consistent of Hodgkin's lymphoma lymphocyte rich type, see report signed out by Dr. Quintanilla on 08/16/2006  No systemic treatment     10/30/2018 ultrasound abdomen-limited gallbladder - no acute process  12/02/2019-PCP visit - concern for lymphoma, referred to Oncology see note  12/10/2019-CBC normal, pathology reviewed  12/02/2019-ultrasound soft tissue neck-within normal limits no abnormal lymph nodes      01/07/2020-today on initial visit    Patient reports:  He has had drenching sweats since 2017  He has severe fatigue that is new - approx 9/2019.    In 2006 - fatigue and sweats noted. Went to doctor then referred to ENT and then had a CT scan and saw enlarged LN and biopsy was done. He was told hodgkin lymphoma. He went to oncologist, Dr. Hart and had a few CT scans within a year and then was told no cancer. He had a second opinion and had another biopsy, this was non diagnostic. He saw another oncologist without his biopsy report and was told he didn't have cancer. Pathologist called him and told him he did indeed have lymphoma but he did not pursue further follow-up.     Med history and ROS otherwise:  HA brief chronic and motrin, he feels they are more frequent though.   Sharp pain in chest and neck recent with fatigue in last 4 mo also.     Went to walk in clinic 12/2019 because he felt pressure in his head and neck, this has been going on a few years - this is "rare" but lasts a few days    Weight - lost 25lbs in last 6 months per his report.   Usually 190 " baseline - now 160's.  Appetite is good and eating same.   He is on testosterone supp for low testosterone - this was started one year or more.   He walks 7-12 miles for work per day.  for [employer] and school.   He assumed testosterone and work lifestyle caused his weight loss up to now.     Testosterone supp helped symptoms initially but now this is no longer helping.     No GI symptoms.  Nocturia - started over last one year. Had urine testing also that was normal. Dr. Bailey. Incomplete emptying and straining. Urgency and frequency. He tried flomax and it did help but he stopped it based on something he read about it. Reports sometimes urination is normal but most of the time not.     He reports in regards to sweats, this was notable  and never stopped since then and much worse in last one year. He has to change sheets, have drenching sweats nightly.   No fever to his knowledge, checks temperature sometimes and not elevated.     No pain except sharp pain in neck, chest.   Chronic back pain mild at night not new or change.     No drinking  +tobacco use 1ppd.     He reports history of opiate abuse and this runs in his family.  He was started on pain medication after an injury and became addicted, this is in the last few years.  He shows good insight and does not want to use opiates again at all cost.  His brother  a month ago from overdose as well as his wife's friend's son  of an overdose recent as well.  He is clearly upset about this and this is motivating him to address his own health.  Tearful, support given      Past Medical History:   Diagnosis Date    Asthma     Kidney stone     (R) x 2     Past Surgical History:   Procedure Laterality Date    APPENDECTOMY      HAND SURGERY Right     ORIF    METACARPAL HARDWARE REMOVAL Right     PERCUTANEOUS TENOTOMY OF ELBOW Right 3/29/2019    Procedure: ultrasonic percutaneous tenotomy of the right common extensor tendon;  Surgeon:  Quintin Yates MD;  [facility];  Service: Orthopedics;  Laterality: Right;    SKIN BIOPSY      x2    TONSILLECTOMY       Social History     Socioeconomic History    Marital status:      Spouse name: Not on file    Number of children: Not on file    Years of education: Not on file    Highest education level: Not on file   Occupational History    Not on file   Social Needs    Financial resource strain: Not on file    Food insecurity:     Worry: Not on file     Inability: Not on file    Transportation needs:     Medical: Not on file     Non-medical: Not on file   Tobacco Use    Smoking status: Current Every Day Smoker     Packs/day: 1.00     Years: 25.00     Pack years: 25.00    Smokeless tobacco: Never Used   Substance and Sexual Activity    Alcohol use: No    Drug use: No    Sexual activity: Not on file   Lifestyle    Physical activity:     Days per week: Not on file     Minutes per session: Not on file    Stress: Not on file   Relationships    Social connections:     Talks on phone: Not on file     Gets together: Not on file     Attends Gnosticist service: Not on file     Active member of club or organization: Not on file     Attends meetings of clubs or organizations: Not on file     Relationship status: Not on file   Other Topics Concern    Not on file   Social History Narrative    Not on file     Family History   Problem Relation Age of Onset    No Known Problems Mother     No Known Problems Father        Current Outpatient Medications   Medication Sig Dispense Refill    ibuprofen (ADVIL,MOTRIN) 800 MG tablet Take 1 tablet (800 mg total) by mouth every 8 (eight) hours as needed for Pain. 30 tablet 1    testosterone 1 % (25 mg/2.5gram) GlPk Place onto the skin every 14 (fourteen) days.       VENTOLIN HFA 90 mcg/actuation inhaler Inhale 2 puffs into the lungs 4 (four) times daily as needed.  4    pravastatin (PRAVACHOL) 40 MG tablet TAKE 1 BY MOUTH DAILY EVERY EVENING.   "0    traMADol (ULTRAM) 50 mg tablet Take 1 tablet (50 mg total) by mouth every 4 (four) hours as needed for Pain. (Patient not taking: Reported on 1/7/2020) 8 tablet 0     No current facility-administered medications for this visit.      ALLERGIES REVIEWED    Review of Systems    Constitutional:  Negative for activity change, negative appetite change, positive fatigue, fever and positive unexpected weight change.   HENT: Negative for mouth sores and sore throat.    Respiratory: Negative for cough and shortness of breath.    Cardiovascular: Negative for chest pain, palpitations and leg swelling. He does get occasional sharp pain to chest and neck that goes away quickly and is not associated with activity  Gastrointestinal: Negative for abdominal pain, constipation and diarrhea.   Genitourinary: Negative for dysuria and positive frequency.  See HPI   Musculoskeletal: Negative for back pain and joint swelling.   Neurological: Negative for weakness, light-headedness and numbness.   Hematological: Does not bruise/bleed easily.   Skin: no rash, no lesions, no itching    Objective:      BP (!) 140/83   Pulse 72   Temp 98.4 °F (36.9 °C) (Oral)   Resp 18   Ht 5' 9" (1.753 m)   Wt 76.8 kg (169 lb 5 oz)   SpO2 96%   BMI 25.00 kg/m²    Wt Readings from Last 25 Encounters:   01/07/20 76.8 kg (169 lb 5 oz)   12/10/19 77.4 kg (170 lb 9.6 oz)   04/17/19 79.4 kg (175 lb)   03/28/19 79.4 kg (175 lb 0.7 oz)   02/13/19 79.4 kg (175 lb)   02/12/19 79.4 kg (175 lb)   01/14/19 79.4 kg (175 lb)   10/30/18 80.6 kg (177 lb 11.1 oz)   07/31/18 79.4 kg (175 lb)   07/17/18 79.4 kg (175 lb)    He reports substantial weight loss in the last year, however weight is noted from July in October 2018 are demonstrating a 5 lb weight loss rather than 25-30.     Physical Exam    Constitutional: He is oriented to person, place, and time. No distress with exception of anxiety.  He is healthy appearing.   HENT: Mouth/Throat: Oropharynx is clear and " moist. No oropharyngeal exudate.   Eyes: Conjunctivae and EOM are normal.   Neck: Normal range of motion. Neck supple.   Cardiovascular: Normal rate, regular rhythm, normal heart sounds and intact distal pulses.    Pulmonary/Chest: Effort normal and breath sounds normal. he has no wheezes. He has no rales.    Abdominal: Soft. Bowel sounds are normal. he exhibits no distension. There is no tenderness.   Musculoskeletal: he exhibits no edema or tenderness.   Lymphadenopathy:   he has no abnormal cervical adenopathy. Possible abnormal supraclavicular adenopathy to right-sided.  Positive right axillary LAD, negative left.   Neurological: he is alert and oriented to person, place, and time. he has normal strength. No cranial nerve deficit or sensory deficit.   Skin: Skin is warm and dry. No rash noted. No erythema.   Psychiatric: he has a normal mood and affect. speech is normal.   Nursing note and vitals reviewed.    No results found for this or any previous visit (from the past 72 hour(s)).    Assessment:       1. Hodgkin lymphoma, unspecified Hodgkin lymphoma type, unspecified body region    2. Weight loss, abnormal    3. Night sweats    4. Anxiety about health    5. At risk for abuse of opiates    6. Tobacco abuse    7. Fatigue, unspecified type        ECOG SCORE         ECOG 1    Patient is a  44 y.o. male here with diagnosis Hodgkin lymphoma per biopsy in the past however felt clinically this was unlikely based on the history provided.  He has had symptoms that could be consistent with B symptoms over time and feels this is worsening in the last 4 months to 1 year.     This is an unusual case however, and his sweats have really been present for greater than 10 years, his weight loss correlates with the same time he started testosterone supplement and started a job where he walks anywhere from 7-12 miles per day, as well review of his weight from 2018 epic records would not demonstrate the weight loss he is  reporting.  He has great anxiety in regards to his health with the death of his brother last month which is understandable and emotional support given.    Discussed diagnosis, work-up, staging and treatment recommendations in great detail with patient and wife.    Recommend detailed imaging with concern for Hodgkin diagnosis prior and need for biopsy with least potential for sampling error or poor diagnostic potential, so hopefully PET-CT with diagnostic CT scans as is indicated for Hodgkin's will reveal an appropriate area for biopsy that will be high yield.  Discussed this in great detail.    Notable anxiety and questions, discussed possible diagnosis and treatments in detail including possibility of not having cancer which he understands.     Advised tobacco cessation and rationale, he has quit in past with success        Plan:       Matty was seen today for results.    Diagnoses and all orders for this visit:    Hodgkin lymphoma, unspecified Hodgkin lymphoma type, unspecified body region  -     CT Abdomen Pelvis W Wo Contrast; Future  -     CT Chest With Contrast; Future  -     CT Soft Tissue Neck With Contrast; Future  -     NM PET CT Routine Skull to Mid Thigh; Future  -     Sedimentation rate; Future  -     Lactate dehydrogenase; Future  -     CBC auto differential; Standing  -     Comprehensive metabolic panel; Standing  -     Hepatitis panel, acute; Future  -     HIV 1/2 Ag/Ab (4th Gen); Future    Weight loss, abnormal    Night sweats    Anxiety about health    At risk for abuse of opiates    Tobacco abuse    Fatigue, unspecified type    Other orders  -     Cancel: Echo Color Flow Doppler? Yes; Future  -     Cancel: Complete PFT with bronchodilator; Future            PLAN:    Labs:  See orders, draw today    Imaging:  PET-CT    Treatment plan:  Pending workup and diagnosis    Follow-up:  MD followup 2 days after PET.  If there is a clear best area to biopsy will refer directly to surgery prior to that  visit.     Exercise/nutrition    Important to keep follow-up with PCP.    Discussed plan above in great detail with patient and wife and all questions answered to their satisfaction. Proceed with plan above.       Thank you for the referral. Please call with any questions.           Ibeth Costello M.D.  Hematology Oncology  [facility]

## 2020-01-07 NOTE — Clinical Note
Labs todayPET with diagnostic CT's on same day. Then MD vis 2 days after PET, (approximately, 3-4d okay if that's all available).

## 2020-01-07 NOTE — LETTER
January 7, 2020      Mariel Sidhu MD  [address] [place] [address]           [facility] [address]  [facility] [address]  Phone: [phone]  Fax: [phone]          Patient: Matty Aquino Sr.   MR Number: [id number]   YOB: 1975   Date of Visit: 1/7/2020       Dear Dr. Mariel Sidhu:    Thank you for referring Matty Aquino to me for evaluation. Attached you will find relevant portions of my assessment and plan of care.    If you have questions, please do not hesitate to call me. I look forward to following Matty Aquino along with you.    Sincerely,    Ibeth Costello MD    Enclosure  CC:  No Recipients    If you would like to receive this communication electronically, please contact [email or web address] or [phone] to request more information on [employer] access.    For providers and/or their staff who would like to refer a patient to [facility], please contact us through our one-stop-shop provider referral line, [facility], at [phone].    If you feel you have received this communication in error or would no longer like to receive these types of communications, please e-mail [email or web address]

## 2020-01-08 LAB — HIV 1+2 AB+HIV1 P24 AG SERPL QL IA: NEGATIVE

## 2020-01-13 ENCOUNTER — HOSPITAL ENCOUNTER (OUTPATIENT)
Dept: RADIOLOGY | Facility: HOSPITAL | Age: 45
Discharge: HOME | End: 2020-01-13
Attending: INTERNAL MEDICINE
Payer: COMMERCIAL

## 2020-01-13 DIAGNOSIS — C81.90 HODGKIN LYMPHOMA, UNSPECIFIED HODGKIN LYMPHOMA TYPE, UNSPECIFIED BODY REGION: ICD-10-CM

## 2020-01-13 PROCEDURE — 70491 CT SOFT TISSUE NECK W/DYE: CPT | Mod: TC,PO

## 2020-01-13 PROCEDURE — 74177 CT ABD & PELVIS W/CONTRAST: CPT | Mod: 26,,, | Performed by: RADIOLOGY

## 2020-01-13 PROCEDURE — 25500020 PHARM REV CODE 255: Mod: PO | Performed by: INTERNAL MEDICINE

## 2020-01-13 PROCEDURE — 74177 CT ABD & PELVIS W/CONTRAST: CPT | Mod: TC,PO

## 2020-01-13 PROCEDURE — 71260 CT CHEST ABDOMEN PELVIS WITH CONTRAST (XPD): ICD-10-PCS | Mod: 26,,, | Performed by: RADIOLOGY

## 2020-01-13 PROCEDURE — 70491 CT SOFT TISSUE NECK WITH CONTRAST: ICD-10-PCS | Mod: 26,,, | Performed by: RADIOLOGY

## 2020-01-13 PROCEDURE — 71260 CT THORAX DX C+: CPT | Mod: 26,,, | Performed by: RADIOLOGY

## 2020-01-13 PROCEDURE — 74177 CT CHEST ABDOMEN PELVIS WITH CONTRAST (XPD): ICD-10-PCS | Mod: 26,,, | Performed by: RADIOLOGY

## 2020-01-13 PROCEDURE — 70491 CT SOFT TISSUE NECK W/DYE: CPT | Mod: 26,,, | Performed by: RADIOLOGY

## 2020-01-13 RX ADMIN — IOHEXOL 75 ML: 350 INJECTION, SOLUTION INTRAVENOUS at 11:01

## 2020-01-13 RX ADMIN — IOHEXOL 1000 ML: 9 SOLUTION ORAL at 11:01

## 2020-01-17 ENCOUNTER — HOSPITAL ENCOUNTER (OUTPATIENT)
Dept: RADIOLOGY | Facility: HOSPITAL | Age: 45
Discharge: HOME | End: 2020-01-17
Attending: INTERNAL MEDICINE
Payer: COMMERCIAL

## 2020-01-17 ENCOUNTER — TELEPHONE (OUTPATIENT)
Dept: HEMATOLOGY/ONCOLOGY | Facility: CLINIC | Age: 45
End: 2020-01-17

## 2020-01-17 DIAGNOSIS — C81.90 HODGKIN LYMPHOMA, UNSPECIFIED HODGKIN LYMPHOMA TYPE, UNSPECIFIED BODY REGION: ICD-10-CM

## 2020-01-17 DIAGNOSIS — I88.9 AXILLARY LYMPHADENITIS: Primary | ICD-10-CM

## 2020-01-17 DIAGNOSIS — R59.0 AXILLARY LYMPHADENOPATHY: ICD-10-CM

## 2020-01-17 PROCEDURE — 78815 NM PET CT ROUTINE: ICD-10-PCS | Mod: 26,PS,, | Performed by: RADIOLOGY

## 2020-01-17 PROCEDURE — 78815 PET IMAGE W/CT SKULL-THIGH: CPT | Mod: TC,PO

## 2020-01-17 PROCEDURE — A9552 F18 FDG: HCPCS | Mod: PO

## 2020-01-17 PROCEDURE — 78815 PET IMAGE W/CT SKULL-THIGH: CPT | Mod: 26,PS,, | Performed by: RADIOLOGY

## 2020-01-17 NOTE — TELEPHONE ENCOUNTER
Discussed PET scan result with patient, recommend biopsy excisional of LN, discussed with Dr. Cooley and will refer patient for biopsy. roberto expressed understanding and pleased with plan.     Jeannie, can you reschedule his appt with me to next Monday or Tuesday? And we will change that according to biopsy date if needed. Thanks    GM

## 2020-01-20 NOTE — PROGRESS NOTES
Thank you for the update I am glad he has everything already in place, I will be going on maternity leave early-mid February but will be following on your updates while at work. Thank you again for caring for this patient, sounds like he has a great plan in place. Let me know if I can assist in any way.   Mariel Sidhu MD

## 2020-01-20 NOTE — PROGRESS NOTES
Noted. I appreciate your recommendations and participation in this patient's care.  Sincerely,  Dr Mariel Sidhu

## 2020-01-27 ENCOUNTER — OFFICE VISIT (OUTPATIENT)
Dept: HEMATOLOGY/ONCOLOGY | Facility: CLINIC | Age: 45
End: 2020-01-27
Payer: COMMERCIAL

## 2020-01-27 VITALS
BODY MASS INDEX: 25.47 KG/M2 | RESPIRATION RATE: 18 BRPM | HEART RATE: 61 BPM | TEMPERATURE: 99 F | SYSTOLIC BLOOD PRESSURE: 132 MMHG | WEIGHT: 171.94 LBS | DIASTOLIC BLOOD PRESSURE: 85 MMHG | OXYGEN SATURATION: 97 % | HEIGHT: 69 IN

## 2020-01-27 DIAGNOSIS — Z72.0 TOBACCO ABUSE: ICD-10-CM

## 2020-01-27 DIAGNOSIS — R45.89 ANXIETY ABOUT HEALTH: ICD-10-CM

## 2020-01-27 DIAGNOSIS — R61 NIGHT SWEATS: ICD-10-CM

## 2020-01-27 DIAGNOSIS — R53.83 FATIGUE, UNSPECIFIED TYPE: ICD-10-CM

## 2020-01-27 DIAGNOSIS — R59.0 AXILLARY LYMPHADENOPATHY: Primary | ICD-10-CM

## 2020-01-27 DIAGNOSIS — R63.4 WEIGHT LOSS, ABNORMAL: ICD-10-CM

## 2020-01-27 PROCEDURE — 99215 OFFICE O/P EST HI 40 MIN: CPT | Mod: S$GLB,,, | Performed by: INTERNAL MEDICINE

## 2020-01-27 PROCEDURE — 99215 PR OFFICE/OUTPT VISIT, EST, LEVL V, 40-54 MIN: ICD-10-PCS | Mod: S$GLB,,, | Performed by: INTERNAL MEDICINE

## 2020-01-27 PROCEDURE — 99999 PR PBB SHADOW E&M-EST. PATIENT-LVL IV: ICD-10-PCS | Mod: PBBFAC,,, | Performed by: INTERNAL MEDICINE

## 2020-01-27 PROCEDURE — 3008F PR BODY MASS INDEX (BMI) DOCUMENTED: ICD-10-PCS | Mod: CPTII,S$GLB,, | Performed by: INTERNAL MEDICINE

## 2020-01-27 PROCEDURE — 99999 PR PBB SHADOW E&M-EST. PATIENT-LVL IV: CPT | Mod: PBBFAC,,, | Performed by: INTERNAL MEDICINE

## 2020-01-27 PROCEDURE — 3008F BODY MASS INDEX DOCD: CPT | Mod: CPTII,S$GLB,, | Performed by: INTERNAL MEDICINE

## 2020-01-27 NOTE — PROGRESS NOTES
"  FOLLOW-UP     DATE: 01/27/2020  Patient Name: Matty Aquino Sr.  Reason for referral:  History of Hodgkin lymphoma with worsening symptoms      Subjective:       Patient ID: Matty Aquino Sr. is a 44 y.o. male.    Chief Complaint: Lymphoma (Hodgkin Lymphoma)    HPI     TODAY 1/27/20, recent CT scans with zero evidence of active hodgkin as well as lab work which is good, reviewed results in detail with pt and wife.  He then had PET scan showing single positive lymph node with SUV greater than 7, referred to surgery and ultrasound did not show remarkable lymph node in this area.    He otherwise denies any new symptoms and continues with his chronic symptoms severe fatigue night sweats.  Weight is stable today and actually improved.     He continues with notable anxiety about his health and concerned "something must be wrong" we had very detailed discussion reviewing all his CT scans PET scan and labs that overall do not indicate any notable pathology.    To review:  8/4/2006 - cervical lymph node biopsy-   Reactive hyperplasia of cervical lymph node   Addendum summary comment:  1 slide with features consistent of Hodgkin's lymphoma lymphocyte rich type, see report signed out by Dr. Quintanilla on 08/16/2006  No systemic treatment     10/30/2018 ultrasound abdomen-limited gallbladder - no acute process  12/02/2019-PCP visit - concern for lymphoma, referred to Oncology see note  12/10/2019-CBC normal, pathology reviewed  12/02/2019-ultrasound soft tissue neck-within normal limits no abnormal lymph nodes  1/7/2020 - iniital heme onc consult flo murphy  1/13/2020 - CT n/c/a/p - PEPE, see report  01/17/2020 - PET scan - reviewed, single right axillary lymph node FDG avid SUV greater than 7, small in size  01/24/2020-ultrasound right axilla -   01/27/2020 - heme onc follow-up        To review from 1st visit 01/07/2020:   Patient reports:  He has had drenching sweats since 2017  He has severe fatigue " "that is new - approx 9/2019.    In 2006 - fatigue and sweats noted. Went to doctor then referred to ENT and then had a CT scan and saw enlarged LN and biopsy was done. He was told hodgkin lymphoma. He went to oncologist, Dr. Hart and had a few CT scans within a year and then was told no cancer. He had a second opinion and had another biopsy, this was non diagnostic. He saw another oncologist without his biopsy report and was told he didn't have cancer. Pathologist called him and told him he did indeed have lymphoma but he did not pursue further follow-up.     Med history and ROS otherwise:  HA brief chronic and motrin, he feels they are more frequent though.   Sharp pain in chest and neck recent with fatigue in last 4 mo also.     Went to walk in clinic 12/2019 because he felt pressure in his head and neck, this has been going on a few years - this is "rare" but lasts a few days    Weight - lost 25lbs in last 6 months per his report.   Usually 190 baseline - now 160's.  Appetite is good and eating same.   He is on testosterone supp for low testosterone - this was started one year or more.   He walks 7-12 miles for work per day.  for [employer] and school.   He assumed testosterone and work lifestyle caused his weight loss up to now.     Testosterone supp helped symptoms initially but now this is no longer helping.     No GI symptoms.  Nocturia - started over last one year. Had urine testing also that was normal. Dr. Bailey. Incomplete emptying and straining. Urgency and frequency. He tried flomax and it did help but he stopped it based on something he read about it. Reports sometimes urination is normal but most of the time not.     He reports in regards to sweats, this was notable 2006 and never stopped since then and much worse in last one year. He has to change sheets, have drenching sweats nightly.   No fever to his knowledge, checks temperature sometimes and not elevated.     No pain except sharp " pain in neck, chest.   Chronic back pain mild at night not new or change.     No drinking  +tobacco use 1ppd.     He reports history of opiate abuse and this runs in his family.  He was started on pain medication after an injury and became addicted, this is in the last few years.  He shows good insight and does not want to use opiates again at all cost.  His brother  a month ago from overdose as well as his wife's friend's son  of an overdose recent as well.  He is clearly upset about this and this is motivating him to address his own health.  Tearful, support given      Past Medical History:   Diagnosis Date    Asthma     Kidney stone     (R) x 2     Past Surgical History:   Procedure Laterality Date    APPENDECTOMY      HAND SURGERY Right     ORIF    METACARPAL HARDWARE REMOVAL Right     PERCUTANEOUS TENOTOMY OF ELBOW Right 3/29/2019    Procedure: ultrasonic percutaneous tenotomy of the right common extensor tendon;  Surgeon: Quintin Yates MD;  Location: [facility];  Service: Orthopedics;  Laterality: Right;    SKIN BIOPSY      x2    TONSILLECTOMY       Social History     Socioeconomic History    Marital status:      Spouse name: Not on file    Number of children: Not on file    Years of education: Not on file    Highest education level: Not on file   Occupational History    Not on file   Social Needs    Financial resource strain: Not on file    Food insecurity:     Worry: Not on file     Inability: Not on file    Transportation needs:     Medical: Not on file     Non-medical: Not on file   Tobacco Use    Smoking status: Current Every Day Smoker     Packs/day: 1.00     Years: 25.00     Pack years: 25.00    Smokeless tobacco: Never Used   Substance and Sexual Activity    Alcohol use: No    Drug use: No    Sexual activity: Not on file   Lifestyle    Physical activity:     Days per week: Not on file     Minutes per session: Not on file    Stress: Not on file    Relationships    Social connections:     Talks on phone: Not on file     Gets together: Not on file     Attends Mosque service: Not on file     Active member of club or organization: Not on file     Attends meetings of clubs or organizations: Not on file     Relationship status: Not on file   Other Topics Concern    Not on file   Social History Narrative    Not on file     Family History   Problem Relation Age of Onset    No Known Problems Mother     No Known Problems Father        Current Outpatient Medications   Medication Sig Dispense Refill    ibuprofen (ADVIL,MOTRIN) 800 MG tablet Take 1 tablet (800 mg total) by mouth every 8 (eight) hours as needed for Pain. 30 tablet 1    methadone (DOLOPHINE) 10 MG tablet Take 50 mg by mouth once daily.      testosterone 1 % (25 mg/2.5gram) GlPk Place onto the skin every 14 (fourteen) days.       VENTOLIN HFA 90 mcg/actuation inhaler Inhale 2 puffs into the lungs 4 (four) times daily as needed.  4     No current facility-administered medications for this visit.      ALLERGIES REVIEWED    Review of Systems    Constitutional:  Negative for activity change, negative appetite change, positive fatigue, fever and positive unexpected weight change is better today.   HENT: Negative for mouth sores and sore throat.    Respiratory: Negative for cough and shortness of breath.    Cardiovascular: Negative for chest pain, palpitations and leg swelling. He does get occasional sharp pain to chest and neck that goes away quickly and is not associated with activity, this is no worse  Gastrointestinal: Negative for abdominal pain, constipation and diarrhea.   Genitourinary: Negative for dysuria and positive frequency.  See HPI   Musculoskeletal: Negative for back pain and joint swelling.   Neurological: Negative for weakness, light-headedness and numbness.   Hematological: Does not bruise/bleed easily.   Skin: no rash, no lesions, no itching    Objective:      /85 (BP  "Location: Right arm, Patient Position: Sitting)   Pulse 61   Temp 98.9 °F (37.2 °C) (Oral)   Resp 18   Ht 5' 9" (1.753 m)   Wt 78 kg (171 lb 15.3 oz)   SpO2 97%   BMI 25.39 kg/m²    Wt Readings from Last 25 Encounters:   01/27/20 78 kg (171 lb 15.3 oz)   01/20/20 76.7 kg (169 lb)   01/07/20 76.8 kg (169 lb 5 oz)   12/10/19 77.4 kg (170 lb 9.6 oz)   04/17/19 79.4 kg (175 lb)   03/28/19 79.4 kg (175 lb 0.7 oz)   02/13/19 79.4 kg (175 lb)   02/12/19 79.4 kg (175 lb)   01/14/19 79.4 kg (175 lb)   10/30/18 80.6 kg (177 lb 11.1 oz)   07/31/18 79.4 kg (175 lb)   07/17/18 79.4 kg (175 lb)    to review:  He reports substantial weight loss in the last year, however weight is noted from July in October 2018 are demonstrating a 5 lb weight loss rather than 25-30.     Physical Exam    Constitutional: He is oriented to person, place, and time. No distress with exception of anxiety.  He is healthy appearing.   HENT: Mouth/Throat: Oropharynx is clear and moist. No oropharyngeal exudate.   Eyes: Conjunctivae and EOM are normal.   Neck: Normal range of motion. Neck supple.   Cardiovascular: Normal rate, regular rhythm, normal heart sounds and intact distal pulses.    Pulmonary/Chest: Effort normal and breath sounds normal. he has no wheezes. He has no rales.    Abdominal: Soft. Bowel sounds are normal. he exhibits no distension. There is no tenderness.   Musculoskeletal: he exhibits no edema or tenderness.   Lymphadenopathy:   he has no abnormal cervical adenopathy. No abnormal supraclavicular adenopathy. Positive right axillary LAD small mobile and soft, negative left.   Neurological: he is alert and oriented to person, place, and time. he has normal strength. No cranial nerve deficit or sensory deficit.   Skin: Skin is warm and dry. No rash noted. No erythema.   Psychiatric: he has a normal mood and affect. speech is normal.   Nursing note and vitals reviewed.    No results found for this or any previous visit (from the past " 72 hour(s)).  01/07/2020 ESR, LDH, CBC, HIV, CMP, hepatitis unremarkable    Assessment:       1. Axillary lymphadenopathy    2. Weight loss, abnormal    3. Anxiety about health    4. Night sweats    5. Tobacco abuse    6. Fatigue, unspecified type        ECOG SCORE         ECOG 1    Patient is a  44 y.o. male here with diagnosis Hodgkin lymphoma per biopsy in the past however felt clinically this was unlikely based on the history provided.  He has had symptoms that could be consistent with B symptoms over time and feels this is worsening in the last 4 months to 1 year.     At 1st visit on 01/07/2020, discussed:  This is an unusual case however, and his sweats have really been present for greater than 10 years, his weight loss correlates with the same time he started testosterone supplement and started a job where he walks anywhere from 7-12 miles per day, as well review of his weight from 2018 epic records would not demonstrate the weight loss he is reporting.  He has great anxiety in regards to his health with the death of his brother last month which is understandable and emotional support given.    Discussed diagnosis, work-up, staging and treatment recommendations in great detail with patient and wife.    Recommend detailed imaging with concern for Hodgkin diagnosis prior and need for biopsy with least potential for sampling error or poor diagnostic potential, so hopefully PET-CT with diagnostic CT scans as is indicated for Hodgkin's will reveal an appropriate area for biopsy that will be high yield.  Discussed this in great detail.    Notable anxiety and questions, discussed possible diagnosis and treatments in detail including possibility of not having cancer which he understands.     Advised tobacco cessation and rationale, he has quit in past with success    01/27/2020:  Labs are very good, CT scans negative and PET scan did show positive findings small lymph node right axilla.  We referred to surgery,  unable to easily locate, for which he had an ultrasound also showing no abnormal lymphadenopathy in discussed in great detail with the radiologist and surgeon.  Multiple normal-appearing lymph nodes and would be hard to even be sure which 1 was FDG avid on PET.     Discussed this in detail with patient and his wife today and offered multiple options including FNA of lymph node, though this may be low yield versus consideration for short interval follow-up ultrasound in the next 4-6 weeks and PET scan approximately 10 weeks and re-evaluate.  Also important any new signs symptoms prior to that time return sooner to re-evaluate and possibly image sooner.  Otherwise there is overall low suspicion of malignant process at this point.  Patient very anxious and discussed this in great detail and he is satisfied with plan for monitoring and repeat imaging and understands to return sooner if any new symptoms, new masses or lymphadenopathy develop in the interim.        Plan:       Matty was seen today for lymphoma.    Diagnoses and all orders for this visit:    Axillary lymphadenopathy  -     CT/PET SCAN ROUTINE; Future  -     US Breast Right Complete; Future    Weight loss, abnormal    Anxiety about health    Night sweats    Tobacco abuse    Fatigue, unspecified type            PLAN:    Ultrasound right axilla approximately 4-6 weeks    PET scan approximately 10 weeks from last PET scan and MD visit 2 days later      Exercise/nutrition    Important to keep follow-up with PCP.    Discussed plan above in great detail with patient and wife and all questions answered to their satisfaction. Proceed with plan above.       Thank you for the referral. Please call with any questions.           Ibeth Costello M.D.  Hematology Oncology  [facility]

## 2020-01-27 NOTE — Clinical Note
Ultrasound right axilla approximately 4-6 weeksPET scan approximately 10 weeks from last PET scan and MD visit 2 days later

## 2020-03-10 ENCOUNTER — HOSPITAL ENCOUNTER (OUTPATIENT)
Dept: RADIOLOGY | Facility: HOSPITAL | Age: 45
Discharge: HOME | End: 2020-03-10
Attending: INTERNAL MEDICINE
Payer: COMMERCIAL

## 2020-03-10 DIAGNOSIS — R59.0 AXILLARY LYMPHADENOPATHY: ICD-10-CM

## 2020-03-10 PROCEDURE — 76882 US LMTD JT/FCL EVL NVASC XTR: CPT | Mod: TC,PO

## 2020-03-10 PROCEDURE — 76882 US LMTD JT/FCL EVL NVASC XTR: CPT | Mod: 26,,, | Performed by: RADIOLOGY

## 2020-03-10 PROCEDURE — 76882 US SOFT TISSUE AXILLA: ICD-10-PCS | Mod: 26,,, | Performed by: RADIOLOGY

## 2020-03-16 ENCOUNTER — TELEPHONE (OUTPATIENT)
Dept: HEMATOLOGY/ONCOLOGY | Facility: CLINIC | Age: 45
End: 2020-03-16

## 2020-03-16 NOTE — TELEPHONE ENCOUNTER
----- Message from Arline Gill sent at 3/16/2020  8:32 AM CDT -----  Contact: Patient  Type: Needs Medical Advice    Who Called:  Patient  Best Call Back Number: [phone]  Additional Information: Patient is calling to get an appeal on his denial for his ct scan on 3/31/20.Please call back and advise.

## 2020-03-16 NOTE — TELEPHONE ENCOUNTER
Returned call to patient and informed him we are working with our auth department and if it's not approved we will let

## 2020-03-20 ENCOUNTER — TELEPHONE (OUTPATIENT)
Dept: HEMATOLOGY/ONCOLOGY | Facility: CLINIC | Age: 45
End: 2020-03-20

## 2020-04-03 ENCOUNTER — PATIENT MESSAGE (OUTPATIENT)
Dept: HEMATOLOGY/ONCOLOGY | Facility: CLINIC | Age: 45
End: 2020-04-03
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
## 2020-07-21 ENCOUNTER — HOSPITAL ENCOUNTER (OUTPATIENT)
Dept: RADIOLOGY | Facility: HOSPITAL | Age: 45
Discharge: HOME | End: 2020-07-21
Attending: INTERNAL MEDICINE
Payer: COMMERCIAL

## 2020-07-21 DIAGNOSIS — R59.0 AXILLARY LYMPHADENOPATHY: ICD-10-CM

## 2020-07-21 DIAGNOSIS — C81.90 HODGKIN LYMPHOMA, UNSPECIFIED HODGKIN LYMPHOMA TYPE, UNSPECIFIED BODY REGION: ICD-10-CM

## 2020-07-21 PROCEDURE — 74177 CT CHEST ABDOMEN PELVIS WITH CONTRAST (XPD): ICD-10-PCS | Mod: 26,,, | Performed by: RADIOLOGY

## 2020-07-21 PROCEDURE — 71260 CT CHEST ABDOMEN PELVIS WITH CONTRAST (XPD): ICD-10-PCS | Mod: 26,,, | Performed by: RADIOLOGY

## 2020-07-21 PROCEDURE — 74177 CT ABD & PELVIS W/CONTRAST: CPT | Mod: TC,PO

## 2020-07-21 PROCEDURE — 74177 CT ABD & PELVIS W/CONTRAST: CPT | Mod: 26,,, | Performed by: RADIOLOGY

## 2020-07-21 PROCEDURE — 71260 CT THORAX DX C+: CPT | Mod: 26,,, | Performed by: RADIOLOGY

## 2020-07-21 PROCEDURE — 25500020 PHARM REV CODE 255: Mod: PO | Performed by: INTERNAL MEDICINE

## 2020-07-21 PROCEDURE — A9698 NON-RAD CONTRAST MATERIALNOC: HCPCS | Mod: PO | Performed by: INTERNAL MEDICINE

## 2020-07-21 RX ADMIN — IOHEXOL 75 ML: 350 INJECTION, SOLUTION INTRAVENOUS at 01:07

## 2020-07-21 RX ADMIN — IOHEXOL 1000 ML: 9 SOLUTION ORAL at 01:07

## 2020-07-22 ENCOUNTER — OFFICE VISIT (OUTPATIENT)
Dept: HEMATOLOGY/ONCOLOGY | Facility: CLINIC | Age: 45
End: 2020-07-22
Payer: COMMERCIAL

## 2020-07-22 VITALS
DIASTOLIC BLOOD PRESSURE: 73 MMHG | HEIGHT: 69 IN | OXYGEN SATURATION: 96 % | TEMPERATURE: 99 F | RESPIRATION RATE: 18 BRPM | WEIGHT: 176.56 LBS | SYSTOLIC BLOOD PRESSURE: 141 MMHG | HEART RATE: 81 BPM | BODY MASS INDEX: 26.15 KG/M2

## 2020-07-22 DIAGNOSIS — C81.90 HODGKIN LYMPHOMA, UNSPECIFIED HODGKIN LYMPHOMA TYPE, UNSPECIFIED BODY REGION: Primary | ICD-10-CM

## 2020-07-22 DIAGNOSIS — R91.1 LUNG NODULE: ICD-10-CM

## 2020-07-22 DIAGNOSIS — Z72.0 TOBACCO ABUSE: ICD-10-CM

## 2020-07-22 DIAGNOSIS — R59.0 AXILLARY LYMPHADENOPATHY: ICD-10-CM

## 2020-07-22 DIAGNOSIS — R45.89 ANXIETY ABOUT HEALTH: ICD-10-CM

## 2020-07-22 PROCEDURE — 3008F BODY MASS INDEX DOCD: CPT | Mod: CPTII,S$GLB,, | Performed by: INTERNAL MEDICINE

## 2020-07-22 PROCEDURE — 3008F PR BODY MASS INDEX (BMI) DOCUMENTED: ICD-10-PCS | Mod: CPTII,S$GLB,, | Performed by: INTERNAL MEDICINE

## 2020-07-22 PROCEDURE — 99215 PR OFFICE/OUTPT VISIT, EST, LEVL V, 40-54 MIN: ICD-10-PCS | Mod: S$GLB,,, | Performed by: INTERNAL MEDICINE

## 2020-07-22 PROCEDURE — 99999 PR PBB SHADOW E&M-EST. PATIENT-LVL III: ICD-10-PCS | Mod: PBBFAC,,, | Performed by: INTERNAL MEDICINE

## 2020-07-22 PROCEDURE — 99215 OFFICE O/P EST HI 40 MIN: CPT | Mod: S$GLB,,, | Performed by: INTERNAL MEDICINE

## 2020-07-22 PROCEDURE — 99999 PR PBB SHADOW E&M-EST. PATIENT-LVL III: CPT | Mod: PBBFAC,,, | Performed by: INTERNAL MEDICINE

## 2021-04-29 ENCOUNTER — PATIENT MESSAGE (OUTPATIENT)
Dept: RESEARCH | Facility: HOSPITAL | Age: 46
End: 2021-04-29

## 2021-07-20 ENCOUNTER — HOSPITAL ENCOUNTER (OUTPATIENT)
Dept: RADIOLOGY | Facility: HOSPITAL | Age: 46
Discharge: HOME | End: 2021-07-20
Attending: INTERNAL MEDICINE
Payer: COMMERCIAL

## 2021-07-20 DIAGNOSIS — R91.1 LUNG NODULE: ICD-10-CM

## 2021-07-20 PROCEDURE — 25500020 PHARM REV CODE 255: Mod: PO | Performed by: INTERNAL MEDICINE

## 2021-07-20 PROCEDURE — 71260 CT CHEST WITH CONTRAST: ICD-10-PCS | Mod: 26,,, | Performed by: RADIOLOGY

## 2021-07-20 PROCEDURE — 71260 CT THORAX DX C+: CPT | Mod: 26,,, | Performed by: RADIOLOGY

## 2021-07-20 PROCEDURE — 71260 CT THORAX DX C+: CPT | Mod: TC,PO

## 2021-07-20 RX ADMIN — IOHEXOL 75 ML: 350 INJECTION, SOLUTION INTRAVENOUS at 09:07

## 2021-07-22 ENCOUNTER — PATIENT MESSAGE (OUTPATIENT)
Dept: HEMATOLOGY/ONCOLOGY | Facility: CLINIC | Age: 46
End: 2021-07-22

## 2021-07-22 ENCOUNTER — OFFICE VISIT (OUTPATIENT)
Dept: HEMATOLOGY/ONCOLOGY | Facility: CLINIC | Age: 46
End: 2021-07-22
Payer: COMMERCIAL

## 2021-07-22 VITALS
RESPIRATION RATE: 18 BRPM | TEMPERATURE: 99 F | BODY MASS INDEX: 26.19 KG/M2 | WEIGHT: 176.81 LBS | OXYGEN SATURATION: 97 % | DIASTOLIC BLOOD PRESSURE: 84 MMHG | HEIGHT: 69 IN | HEART RATE: 60 BPM | SYSTOLIC BLOOD PRESSURE: 120 MMHG

## 2021-07-22 DIAGNOSIS — R45.89 ANXIETY ABOUT HEALTH: ICD-10-CM

## 2021-07-22 DIAGNOSIS — Z72.0 TOBACCO ABUSE: ICD-10-CM

## 2021-07-22 DIAGNOSIS — E29.1 HYPOGONADISM IN MALE: ICD-10-CM

## 2021-07-22 DIAGNOSIS — R91.8 LUNG NODULES: Primary | ICD-10-CM

## 2021-07-22 PROCEDURE — 3008F PR BODY MASS INDEX (BMI) DOCUMENTED: ICD-10-PCS | Mod: CPTII,S$GLB,, | Performed by: INTERNAL MEDICINE

## 2021-07-22 PROCEDURE — 99213 PR OFFICE/OUTPT VISIT, EST, LEVL III, 20-29 MIN: ICD-10-PCS | Mod: S$GLB,,, | Performed by: INTERNAL MEDICINE

## 2021-07-22 PROCEDURE — 99999 PR PBB SHADOW E&M-EST. PATIENT-LVL IV: CPT | Mod: PBBFAC,,, | Performed by: INTERNAL MEDICINE

## 2021-07-22 PROCEDURE — 99999 PR PBB SHADOW E&M-EST. PATIENT-LVL IV: ICD-10-PCS | Mod: PBBFAC,,, | Performed by: INTERNAL MEDICINE

## 2021-07-22 PROCEDURE — 1126F AMNT PAIN NOTED NONE PRSNT: CPT | Mod: CPTII,S$GLB,, | Performed by: INTERNAL MEDICINE

## 2021-07-22 PROCEDURE — 1159F MED LIST DOCD IN RCRD: CPT | Mod: CPTII,S$GLB,, | Performed by: INTERNAL MEDICINE

## 2021-07-22 PROCEDURE — 3008F BODY MASS INDEX DOCD: CPT | Mod: CPTII,S$GLB,, | Performed by: INTERNAL MEDICINE

## 2021-07-22 PROCEDURE — 1126F PR PAIN SEVERITY QUANTIFIED, NO PAIN PRESENT: ICD-10-PCS | Mod: CPTII,S$GLB,, | Performed by: INTERNAL MEDICINE

## 2021-07-22 PROCEDURE — 1159F PR MEDICATION LIST DOCUMENTED IN MEDICAL RECORD: ICD-10-PCS | Mod: CPTII,S$GLB,, | Performed by: INTERNAL MEDICINE

## 2021-07-22 PROCEDURE — 99213 OFFICE O/P EST LOW 20 MIN: CPT | Mod: S$GLB,,, | Performed by: INTERNAL MEDICINE

## 2021-07-22 RX ORDER — METOPROLOL SUCCINATE 25 MG/1
25 TABLET, EXTENDED RELEASE ORAL 3 TIMES DAILY
COMMUNITY
Start: 2021-07-12

## 2021-07-22 RX ORDER — ROSUVASTATIN CALCIUM 20 MG/1
20 TABLET, COATED ORAL DAILY
COMMUNITY
Start: 2021-07-12

## 2021-07-22 RX ORDER — ASPIRIN 81 MG/1
81 TABLET ORAL DAILY
COMMUNITY
Start: 2021-06-24

## 2021-07-22 RX ORDER — BENZONATATE 200 MG/1
200 CAPSULE ORAL 3 TIMES DAILY PRN
COMMUNITY
Start: 2021-07-08

## 2021-07-22 RX ORDER — ROSUVASTATIN CALCIUM 40 MG/1
TABLET, COATED ORAL
COMMUNITY
Start: 2021-01-27

## 2021-07-22 RX ORDER — LOSARTAN POTASSIUM 50 MG/1
TABLET ORAL
COMMUNITY
Start: 2021-01-27

## 2021-07-22 RX ORDER — AZITHROMYCIN 250 MG/1
TABLET, FILM COATED ORAL
COMMUNITY
Start: 2021-07-08 | End: 2022-09-27

## 2021-07-22 RX ORDER — METOPROLOL SUCCINATE 100 MG/1
TABLET, EXTENDED RELEASE ORAL
COMMUNITY
Start: 2021-02-24

## 2021-07-22 RX ORDER — TESTOSTERONE CYPIONATE 200 MG/ML
INJECTION, SOLUTION INTRAMUSCULAR
COMMUNITY
Start: 2021-05-28

## 2022-06-29 ENCOUNTER — TELEPHONE (OUTPATIENT)
Dept: HEMATOLOGY/ONCOLOGY | Facility: CLINIC | Age: 47
End: 2022-06-29
Payer: COMMERCIAL

## 2022-06-29 NOTE — TELEPHONE ENCOUNTER
Spoke with the pt and got the pt rescheduled on 07/22. Pt verbalized and understanding.  ----- Message from Mony Box Patient Care Assistant sent at 6/29/2022  9:13 AM CDT -----  Regarding: return call  Type:  Patient Returning Call    Who Called:  roberto  Who Left Message for Patient:  Ximena  Does the patient know what this is regarding?:  appointment   Best Call Back Number:  [phone]    Additional Information:  patient is returning call please call back

## 2022-07-13 DIAGNOSIS — R91.8 LUNG NODULES: Primary | ICD-10-CM

## 2022-07-13 DIAGNOSIS — C81.90 HODGKIN LYMPHOMA, UNSPECIFIED HODGKIN LYMPHOMA TYPE, UNSPECIFIED BODY REGION: ICD-10-CM

## 2022-07-18 ENCOUNTER — HOSPITAL ENCOUNTER (OUTPATIENT)
Dept: RADIOLOGY | Facility: HOSPITAL | Age: 47
Discharge: HOME | End: 2022-07-18
Attending: INTERNAL MEDICINE
Payer: COMMERCIAL

## 2022-07-18 DIAGNOSIS — R91.8 LUNG NODULES: ICD-10-CM

## 2022-07-18 PROCEDURE — 71260 CT THORAX DX C+: CPT | Mod: TC,PO

## 2022-07-18 PROCEDURE — 25500020 PHARM REV CODE 255: Mod: PO | Performed by: INTERNAL MEDICINE

## 2022-07-18 PROCEDURE — 71260 CT THORAX DX C+: CPT | Mod: 26,,, | Performed by: RADIOLOGY

## 2022-07-18 PROCEDURE — 71260 CT CHEST WITH CONTRAST: ICD-10-PCS | Mod: 26,,, | Performed by: RADIOLOGY

## 2022-07-18 RX ADMIN — IOHEXOL 75 ML: 350 INJECTION, SOLUTION INTRAVENOUS at 09:07

## 2022-09-01 ENCOUNTER — PATIENT MESSAGE (OUTPATIENT)
Dept: HEMATOLOGY/ONCOLOGY | Facility: CLINIC | Age: 47
End: 2022-09-01
Payer: COMMERCIAL

## 2022-09-27 PROBLEM — R20.2 NUMBNESS AND TINGLING IN LEFT ARM: Status: ACTIVE | Noted: 2018-07-17

## 2022-09-27 PROBLEM — M75.22 BICEPS TENDONITIS, LEFT: Status: ACTIVE | Noted: 2022-09-27

## 2022-09-28 ENCOUNTER — TELEPHONE (OUTPATIENT)
Dept: PHYSICAL MEDICINE AND REHAB | Facility: CLINIC | Age: 47
End: 2022-09-28
Payer: COMMERCIAL

## 2022-09-28 NOTE — TELEPHONE ENCOUNTER
Informed pt that Dr. Yates will be out for their appointment on 10/10/2022 and would need to reschedule. Pt understood and the appt was moved to 10/13/2022.

## 2023-06-05 ENCOUNTER — OFFICE VISIT (OUTPATIENT)
Dept: ORTHOPEDICS | Facility: CLINIC | Age: 48
End: 2023-06-05
Payer: COMMERCIAL

## 2023-06-05 VITALS — HEIGHT: 69 IN | WEIGHT: 175 LBS | BODY MASS INDEX: 25.92 KG/M2

## 2023-06-05 DIAGNOSIS — M65.342 TRIGGER FINGER, LEFT RING FINGER: ICD-10-CM

## 2023-06-05 DIAGNOSIS — M65.331 TRIGGER FINGER, RIGHT MIDDLE FINGER: Primary | ICD-10-CM

## 2023-06-05 PROCEDURE — 20550 TENDON SHEATH: ICD-10-PCS | Mod: 50,S$GLB,, | Performed by: ORTHOPAEDIC SURGERY

## 2023-06-05 PROCEDURE — 20550 NJX 1 TENDON SHEATH/LIGAMENT: CPT | Mod: 50,S$GLB,, | Performed by: ORTHOPAEDIC SURGERY

## 2023-06-05 PROCEDURE — 99203 PR OFFICE/OUTPT VISIT, NEW, LEVL III, 30-44 MIN: ICD-10-PCS | Mod: 25,S$GLB,, | Performed by: ORTHOPAEDIC SURGERY

## 2023-06-05 PROCEDURE — 3008F PR BODY MASS INDEX (BMI) DOCUMENTED: ICD-10-PCS | Mod: CPTII,S$GLB,, | Performed by: ORTHOPAEDIC SURGERY

## 2023-06-05 PROCEDURE — 1159F MED LIST DOCD IN RCRD: CPT | Mod: CPTII,S$GLB,, | Performed by: ORTHOPAEDIC SURGERY

## 2023-06-05 PROCEDURE — 99999 PR PBB SHADOW E&M-EST. PATIENT-LVL III: CPT | Mod: PBBFAC,,, | Performed by: ORTHOPAEDIC SURGERY

## 2023-06-05 PROCEDURE — 99203 OFFICE O/P NEW LOW 30 MIN: CPT | Mod: 25,S$GLB,, | Performed by: ORTHOPAEDIC SURGERY

## 2023-06-05 PROCEDURE — 1159F PR MEDICATION LIST DOCUMENTED IN MEDICAL RECORD: ICD-10-PCS | Mod: CPTII,S$GLB,, | Performed by: ORTHOPAEDIC SURGERY

## 2023-06-05 PROCEDURE — 3008F BODY MASS INDEX DOCD: CPT | Mod: CPTII,S$GLB,, | Performed by: ORTHOPAEDIC SURGERY

## 2023-06-05 PROCEDURE — 99999 PR PBB SHADOW E&M-EST. PATIENT-LVL III: ICD-10-PCS | Mod: PBBFAC,,, | Performed by: ORTHOPAEDIC SURGERY

## 2023-06-05 RX ORDER — TRIAMCINOLONE ACETONIDE 40 MG/ML
40 INJECTION, SUSPENSION INTRA-ARTICULAR; INTRAMUSCULAR
Status: DISCONTINUED | OUTPATIENT
Start: 2023-06-05 | End: 2023-06-05 | Stop reason: HOSPADM

## 2023-06-05 RX ADMIN — TRIAMCINOLONE ACETONIDE 40 MG: 40 INJECTION, SUSPENSION INTRA-ARTICULAR; INTRAMUSCULAR at 02:06

## 2023-06-05 NOTE — PROCEDURES
Tendon Sheath    Date/Time: 6/5/2023 2:40 PM  Performed by: Devin Cross MD  Authorized by: Devin Cross MD     Consent Done?:  Yes (Verbal)  Indications:  Pain  Site marked: the procedure site was marked    Timeout: prior to procedure the correct patient, procedure, and site was verified    Prep: patient was prepped and draped in usual sterile fashion      Local anesthetic:  Lidocaine 1% without epinephrine  Anesthetic total (ml):  0.5    Location:  Ring finger  Site:  L ring flexor tendon sheath  Needle size:  25 G  Medications:  40 mg triamcinolone acetonide 40 mg/mL (20mg injected)  Patient tolerance:  Patient tolerated the procedure well with no immediate complications

## 2023-06-05 NOTE — PROCEDURES
Tendon Sheath    Date/Time: 6/5/2023 2:40 PM  Performed by: Devin Cross MD  Authorized by: Devin Cross MD     Consent Done?:  Yes (Verbal)  Indications:  Pain  Site marked: the procedure site was marked    Timeout: prior to procedure the correct patient, procedure, and site was verified    Prep: patient was prepped and draped in usual sterile fashion      Local anesthetic:  Lidocaine 1% without epinephrine  Anesthetic total (ml):  0.5    Location:  Long finger  Site:  R long flexor tendon sheath  Needle size:  25 G  Medications:  40 mg triamcinolone acetonide 40 mg/mL (20mg injected)  Patient tolerance:  Patient tolerated the procedure well with no immediate complications

## 2023-06-05 NOTE — PROGRESS NOTES
6/5/2023    Chief Complaint:  Chief Complaint   Patient presents with    Right Hand - Pain     Right middle finger     Left Hand - Pain     Left ring finger        HPI:  Matty Aquino Sr. is a 48 y.o. male, who presents to clinic today has a history of multiple trigger fingers.  He states that he has been having triggering of his right middle finger as well as his left ring finger.  States that his left hand is moderately painful.  Has not had any treatment or evaluation.    PMHX:  Past Medical History:   Diagnosis Date    Anxiety about health     Asthma     Hodgkin lymphoma     This is questionable diagnosis    Hypogonadism in male     Kidney stone     (R) x 2    Lung nodules     Right lateral epicondylitis        PSHX:  Past Surgical History:   Procedure Laterality Date    APPENDECTOMY  1989    HAND SURGERY Right     ORIF    METACARPAL HARDWARE REMOVAL Right     PERCUTANEOUS TENOTOMY OF ELBOW Right 3/29/2019    Procedure: ultrasonic percutaneous tenotomy of the right common extensor tendon;  Surgeon: Quintin Yates MD;  Location: [facility] OR;  Service: Orthopedics;  Laterality: Right;    SKIN BIOPSY      x2    TONSILLECTOMY         FMHX:  Family History   Problem Relation Age of Onset    No Known Problems Mother     No Known Problems Father        SOCHX:  Social History     Tobacco Use    Smoking status: Every Day     Packs/day: 1.00     Years: 25.00     Pack years: 25.00     Types: Cigarettes    Smokeless tobacco: Never   Substance Use Topics    Alcohol use: No       ALLERGIES:  Pcn [penicillins]    CURRENT MEDICATIONS:  Current Outpatient Medications on File Prior to Visit   Medication Sig Dispense Refill    aspirin (ECOTRIN) 81 MG EC tablet Take 81 mg by mouth once daily.      benzonatate (TESSALON) 200 MG capsule Take 200 mg by mouth 3 (three) times daily as needed.      losartan (COZAAR) 50 MG tablet       metoprolol succinate (TOPROL-XL) 100 MG 24 hr tablet       metoprolol succinate (TOPROL-XL)  "25 MG 24 hr tablet Take 25 mg by mouth 3 (three) times daily.      rosuvastatin (CRESTOR) 20 MG tablet Take 20 mg by mouth once daily.      rosuvastatin (CRESTOR) 40 MG Tab       testosterone cypionate (DEPOTESTOTERONE CYPIONATE) 200 mg/mL injection INJECT 2 MLS INTRAMUSCULARLY EVERY TWO WEEKS       No current facility-administered medications on file prior to visit.       REVIEW OF SYSTEMS:  Review of Systems   Constitutional: Negative.    HENT: Negative.     Eyes: Negative.    Respiratory: Negative.     Cardiovascular: Negative.    Gastrointestinal: Negative.    Genitourinary: Negative.    Musculoskeletal:  Positive for joint pain.   Skin: Negative.    Neurological: Negative.    Endo/Heme/Allergies: Negative.    Psychiatric/Behavioral: Negative.       GENERAL PHYSICAL EXAM:   Ht 5' 9" (1.753 m)   Wt 79.4 kg (175 lb)   BMI 25.84 kg/m²    GEN: well developed, well nourished, no acute distress   HENT: Normocephalic, atraumatic   EYES: No discharge, conjunctiva normal   NECK: Supple, non-tender   PULM: No wheezing, no respiratory distress   CV: RRR   ABD: Soft, non-tender    ORTHO EXAM:   Examination of bilateral hands reveals that there is no edema.  There are no major skin changes.  Palpation produces tenderness over the left ring finger A1 pulley and only mildly on the right middle finger.  Flexion and extension of the fingers reveals that there is active triggering of the right middle finger and the left ring finger.  He does have sensation which is grossly intact and capillary refill less than 2 seconds in the digits    RADIOLOGY:   None    ASSESSMENT:   Left ring finger triggering, right middle finger triggering    PLAN:  1. I have discussed treatment with the patient.  After discussion of the risks and benefits informed consent has been obtained proceed with right middle finger and left ring finger trigger injections.  These injections were performed today    2.  He will follow up with me on a p.r.n. basis   "

## 2023-08-04 ENCOUNTER — LAB VISIT (OUTPATIENT)
Dept: LAB | Facility: HOSPITAL | Age: 48
End: 2023-08-04
Attending: INTERNAL MEDICINE
Payer: COMMERCIAL

## 2023-08-04 DIAGNOSIS — C81.90 HODGKIN LYMPHOMA, UNSPECIFIED HODGKIN LYMPHOMA TYPE, UNSPECIFIED BODY REGION: ICD-10-CM

## 2023-08-04 LAB
ALBUMIN SERPL BCP-MCNC: 4.3 G/DL (ref 3.5–5.2)
ALP SERPL-CCNC: 45 U/L (ref 55–135)
ALT SERPL W/O P-5'-P-CCNC: 20 U/L (ref 10–44)
ANION GAP SERPL CALC-SCNC: 10 MMOL/L (ref 8–16)
AST SERPL-CCNC: 21 U/L (ref 10–40)
BASOPHILS # BLD AUTO: 0.03 K/UL (ref 0–0.2)
BASOPHILS NFR BLD: 0.4 % (ref 0–1.9)
BILIRUB SERPL-MCNC: 0.6 MG/DL (ref 0.1–1)
BUN SERPL-MCNC: 11 MG/DL (ref 6–20)
CALCIUM SERPL-MCNC: 9.2 MG/DL (ref 8.7–10.5)
CHLORIDE SERPL-SCNC: 102 MMOL/L (ref 95–110)
CO2 SERPL-SCNC: 27 MMOL/L (ref 23–29)
CREAT SERPL-MCNC: 1.1 MG/DL (ref 0.5–1.4)
DIFFERENTIAL METHOD: ABNORMAL
EOSINOPHIL # BLD AUTO: 0.2 K/UL (ref 0–0.5)
EOSINOPHIL NFR BLD: 3.1 % (ref 0–8)
ERYTHROCYTE [DISTWIDTH] IN BLOOD BY AUTOMATED COUNT: 12.8 % (ref 11.5–14.5)
EST. GFR  (NO RACE VARIABLE): >60 ML/MIN/1.73 M^2
GLUCOSE SERPL-MCNC: 126 MG/DL (ref 70–110)
HCT VFR BLD AUTO: 49.4 % (ref 40–54)
HGB BLD-MCNC: 16.7 G/DL (ref 14–18)
IMM GRANULOCYTES # BLD AUTO: 0.04 K/UL (ref 0–0.04)
IMM GRANULOCYTES NFR BLD AUTO: 0.6 % (ref 0–0.5)
LYMPHOCYTES # BLD AUTO: 2 K/UL (ref 1–4.8)
LYMPHOCYTES NFR BLD: 29.2 % (ref 18–48)
MCH RBC QN AUTO: 32.9 PG (ref 27–31)
MCHC RBC AUTO-ENTMCNC: 33.8 G/DL (ref 32–36)
MCV RBC AUTO: 97 FL (ref 82–98)
MONOCYTES # BLD AUTO: 0.8 K/UL (ref 0.3–1)
MONOCYTES NFR BLD: 12.4 % (ref 4–15)
NEUTROPHILS # BLD AUTO: 3.6 K/UL (ref 1.8–7.7)
NEUTROPHILS NFR BLD: 54.3 % (ref 38–73)
NRBC BLD-RTO: 0 /100 WBC
PLATELET # BLD AUTO: 214 K/UL (ref 150–450)
PMV BLD AUTO: 9.8 FL (ref 9.2–12.9)
POTASSIUM SERPL-SCNC: 4 MMOL/L (ref 3.5–5.1)
PROT SERPL-MCNC: 7.1 G/DL (ref 6–8.4)
RBC # BLD AUTO: 5.07 M/UL (ref 4.6–6.2)
SODIUM SERPL-SCNC: 139 MMOL/L (ref 136–145)
WBC # BLD AUTO: 6.71 K/UL (ref 3.9–12.7)

## 2023-08-04 PROCEDURE — 85025 COMPLETE CBC W/AUTO DIFF WBC: CPT | Mod: PN | Performed by: INTERNAL MEDICINE

## 2023-08-04 PROCEDURE — 80053 COMPREHEN METABOLIC PANEL: CPT | Mod: PN | Performed by: INTERNAL MEDICINE

## 2023-08-04 PROCEDURE — 36415 COLL VENOUS BLD VENIPUNCTURE: CPT | Mod: PN | Performed by: INTERNAL MEDICINE

## 2024-01-08 ENCOUNTER — OFFICE VISIT (OUTPATIENT)
Dept: ORTHOPEDICS | Facility: CLINIC | Age: 49
End: 2024-01-08
Payer: COMMERCIAL

## 2024-01-08 DIAGNOSIS — M65.342 TRIGGER FINGER, LEFT RING FINGER: ICD-10-CM

## 2024-01-08 DIAGNOSIS — M65.331 TRIGGER FINGER, RIGHT MIDDLE FINGER: Primary | ICD-10-CM

## 2024-01-08 PROCEDURE — 1159F MED LIST DOCD IN RCRD: CPT | Mod: CPTII,S$GLB,, | Performed by: ORTHOPAEDIC SURGERY

## 2024-01-08 PROCEDURE — 20550 NJX 1 TENDON SHEATH/LIGAMENT: CPT | Mod: 50,S$GLB,, | Performed by: ORTHOPAEDIC SURGERY

## 2024-01-08 PROCEDURE — 99999 PR PBB SHADOW E&M-EST. PATIENT-LVL II: CPT | Mod: PBBFAC,,, | Performed by: ORTHOPAEDIC SURGERY

## 2024-01-08 PROCEDURE — 99213 OFFICE O/P EST LOW 20 MIN: CPT | Mod: 25,S$GLB,, | Performed by: ORTHOPAEDIC SURGERY

## 2024-01-08 RX ADMIN — TRIAMCINOLONE ACETONIDE 40 MG: 40 INJECTION, SUSPENSION INTRA-ARTICULAR; INTRAMUSCULAR at 10:01

## 2024-01-08 NOTE — PROGRESS NOTES
Mr Miles returns to clinic today.  Has a history of right middle and will follow up it is it is and left  ring finger triggering.  He was injected 6-8 months ago initially did well.  He has had a return of both of those triggers.      Physical exam:  Examination of the right and left hands reveals that there is no edema.  There are no skin changes.  Palpation produces mild tenderness over the right middle finger A1 pulley and a significant amount of tenderness over the left ring finger A1 pulley.  There is a large nodule/ganglion overlying the tendon sheath left ring finger.  Flexion and extension does reveal active triggering.  He does have sensation intact.      Assessment:  Right middle and left ring finger triggering    Plan:    1.  After consent was obtained we did proceed with right middle and left ring finger trigger injections.    2. I have discussed the possibility of surgical release.  If he has a recurrence after this injection we will discuss the possibility of releasing those triggers    3. Will follow up with me as needed

## 2024-01-10 RX ORDER — TRIAMCINOLONE ACETONIDE 40 MG/ML
40 INJECTION, SUSPENSION INTRA-ARTICULAR; INTRAMUSCULAR
Status: DISCONTINUED | OUTPATIENT
Start: 2024-01-08 | End: 2024-01-10 | Stop reason: HOSPADM

## 2024-01-10 NOTE — PROCEDURES
Tendon Sheath    Date/Time: 1/8/2024 10:20 AM    Performed by: Devin Cross MD  Authorized by: Devin Cross MD    Consent Done?:  Yes (Verbal)  Indications:  Pain  Site marked: the procedure site was marked    Timeout: prior to procedure the correct patient, procedure, and site was verified    Prep: patient was prepped and draped in usual sterile fashion      Local anesthetic:  Lidocaine 1% without epinephrine  Anesthetic total (ml):  0.5    Location:  Ring finger  Site:  L ring flexor tendon sheath  Needle size:  25 G  Medications:  40 mg triamcinolone acetonide 40 mg/mL (20mg injected)  Patient tolerance:  Patient tolerated the procedure well with no immediate complications

## 2024-01-10 NOTE — PROCEDURES
Tendon Sheath    Date/Time: 1/8/2024 10:20 AM    Performed by: Devin Cross MD  Authorized by: Devin Cross MD    Consent Done?:  Yes (Verbal)  Indications:  Pain  Site marked: the procedure site was marked    Timeout: prior to procedure the correct patient, procedure, and site was verified    Prep: patient was prepped and draped in usual sterile fashion      Local anesthetic:  Lidocaine 1% without epinephrine  Anesthetic total (ml):  0.5    Location:  Long finger  Site:  R long flexor tendon sheath  Needle size:  25 G  Medications:  40 mg triamcinolone acetonide 40 mg/mL (20mg injected)  Patient tolerance:  Patient tolerated the procedure well with no immediate complications

## 2024-09-30 ENCOUNTER — OFFICE VISIT (OUTPATIENT)
Dept: ORTHOPEDICS | Facility: CLINIC | Age: 49
End: 2024-09-30
Payer: COMMERCIAL

## 2024-09-30 ENCOUNTER — TELEPHONE (OUTPATIENT)
Dept: ORTHOPEDICS | Facility: CLINIC | Age: 49
End: 2024-09-30

## 2024-09-30 DIAGNOSIS — M65.331 TRIGGER FINGER, RIGHT MIDDLE FINGER: Primary | ICD-10-CM

## 2024-09-30 DIAGNOSIS — M65.342 TRIGGER FINGER, LEFT RING FINGER: ICD-10-CM

## 2024-09-30 PROCEDURE — 1159F MED LIST DOCD IN RCRD: CPT | Mod: CPTII,S$GLB,, | Performed by: ORTHOPAEDIC SURGERY

## 2024-09-30 PROCEDURE — 99213 OFFICE O/P EST LOW 20 MIN: CPT | Mod: S$GLB,,, | Performed by: ORTHOPAEDIC SURGERY

## 2024-09-30 PROCEDURE — 99999 PR PBB SHADOW E&M-EST. PATIENT-LVL II: CPT | Mod: PBBFAC,,, | Performed by: ORTHOPAEDIC SURGERY

## 2024-09-30 RX ORDER — CEFAZOLIN SODIUM 2 G/50ML
2 SOLUTION INTRAVENOUS
OUTPATIENT
Start: 2024-09-30

## 2024-09-30 NOTE — PROGRESS NOTES
9/30/2024    Chief Complaint:  Chief Complaint   Patient presents with    Right Hand - Pain     Middle finger trigger finger    Left Hand - Pain     Ring trigger finger       HPI:  Matty Aquino Sr. is a 49 y.o. male, who presents to clinic today has a history of triggering of his right middle and left ring fingers.  He has been injected in the past.  He continued to have recurrence of the triggering and pain.  He was here today to discuss further treatment.    PMHX:  Past Medical History:   Diagnosis Date    Anxiety about health     Asthma     Hodgkin lymphoma     This is questionable diagnosis    Hypogonadism in male     Kidney stone     (R) x 2    Lung nodules     Right lateral epicondylitis        PSHX:  Past Surgical History:   Procedure Laterality Date    APPENDECTOMY  1989    HAND SURGERY Right     ORIF    METACARPAL HARDWARE REMOVAL Right     PERCUTANEOUS TENOTOMY OF ELBOW Right 3/29/2019    Procedure: ultrasonic percutaneous tenotomy of the right common extensor tendon;  Surgeon: Quintin Yates MD;  Location: [facility] OR;  Service: Orthopedics;  Laterality: Right;    SKIN BIOPSY      x2    TONSILLECTOMY         FMHX:  Family History   Problem Relation Name Age of Onset    No Known Problems Mother      No Known Problems Father         SOCHX:  Social History     Tobacco Use    Smoking status: Every Day     Current packs/day: 1.00     Average packs/day: 1 pack/day for 25.0 years (25.0 ttl pk-yrs)     Types: Cigarettes    Smokeless tobacco: Never   Substance Use Topics    Alcohol use: No       ALLERGIES:  Pcn [penicillins]    CURRENT MEDICATIONS:  Current Outpatient Medications on File Prior to Visit   Medication Sig Dispense Refill    aspirin (ECOTRIN) 81 MG EC tablet Take 81 mg by mouth once daily.      benzonatate (TESSALON) 200 MG capsule Take 200 mg by mouth 3 (three) times daily as needed.      losartan (COZAAR) 50 MG tablet       metoprolol succinate (TOPROL-XL) 100 MG 24 hr tablet        metoprolol succinate (TOPROL-XL) 25 MG 24 hr tablet Take 25 mg by mouth 3 (three) times daily.      rosuvastatin (CRESTOR) 20 MG tablet Take 20 mg by mouth once daily.      rosuvastatin (CRESTOR) 40 MG Tab       testosterone cypionate (DEPOTESTOTERONE CYPIONATE) 200 mg/mL injection INJECT 2 MLS INTRAMUSCULARLY EVERY TWO WEEKS       No current facility-administered medications on file prior to visit.       REVIEW OF SYSTEMS:  ROS    GENERAL PHYSICAL EXAM:   There were no vitals taken for this visit.   GEN: well developed, well nourished, no acute distress   HENT: Normocephalic, atraumatic   EYES: No discharge, conjunctiva normal   NECK: Supple, non-tender   PULM: No wheezing, no respiratory distress   CV: RRR   ABD: Soft, non-tender    ORTHO EXAM:   Examination of bilateral hands reveals that there is no edema.  There are no skin changes.  Palpation does produce tenderness over the A1 pulley of the right middle and left ring fingers.  There was nodules noted at those sites.  Flexion and extension does reveal active triggering.  He was neurovascularly intact    RADIOLOGY:   None    ASSESSMENT:   Right middle finger and left ring finger triggering    PLAN:  1. I have discussed treatment with the patient.  I have discussed the possibility of trigger finger release.  I did discuss the risks and benefits and he would like to proceed with a right middle finger trigger release.      2.  After consent was obtained we will proceed with right middle finger trigger release under local anesthesia     3.  Will follow up with me 2 weeks after the surgery.  We will consider his left ring finger trigger release after he recovers from the right

## 2024-09-30 NOTE — TELEPHONE ENCOUNTER
Spoke with caller and surgery rescheduled   ----- Message from Dilcia sent at 9/30/2024 12:33 PM CDT -----  Regarding: rs  Contact: pt  Type: Needs Medical Advice  Who Called:  patient  Symptoms (please be specific):  time and date no longer work   How long has patient had these symptoms:    Pharmacy name and phone #:    Best Call Back Number: [phone]    Additional Information: call to suad

## 2024-09-30 NOTE — PATIENT INSTRUCTIONS
Surgery Instructions:     Your surgery is scheduled on 10/17/2024 at the surgery center: [address], 1st floor, second entrance.    The pre-op department will be in contact with you prior to your procedure to review medications and instructions.       Nothing to eat or drink after midnight prior to day of surgery.    You should STOP taking any blood thinners 5 days prior to surgery.     The surgery center will contact you the day prior to surgery to advise you of your arrival time for surgery.     Your post op appointment is scheduled on 11/1 at 10:00.

## 2024-10-22 ENCOUNTER — HOSPITAL ENCOUNTER (OUTPATIENT)
Facility: HOSPITAL | Age: 49
Discharge: HOME | End: 2024-10-22
Attending: ORTHOPAEDIC SURGERY | Admitting: ORTHOPAEDIC SURGERY
Payer: COMMERCIAL

## 2024-10-22 VITALS
SYSTOLIC BLOOD PRESSURE: 140 MMHG | TEMPERATURE: 98 F | OXYGEN SATURATION: 95 % | BODY MASS INDEX: 25.18 KG/M2 | HEIGHT: 69 IN | HEART RATE: 63 BPM | DIASTOLIC BLOOD PRESSURE: 76 MMHG | WEIGHT: 170 LBS | RESPIRATION RATE: 18 BRPM

## 2024-10-22 DIAGNOSIS — M65.331 TRIGGER FINGER, RIGHT MIDDLE FINGER: ICD-10-CM

## 2024-10-22 PROCEDURE — 36000706: Mod: PO | Performed by: ORTHOPAEDIC SURGERY

## 2024-10-22 PROCEDURE — 36000707: Mod: PO | Performed by: ORTHOPAEDIC SURGERY

## 2024-10-22 PROCEDURE — 63600175 PHARM REV CODE 636 W HCPCS: Mod: JZ,JG,PO | Performed by: ORTHOPAEDIC SURGERY

## 2024-10-22 PROCEDURE — 71000015 HC POSTOP RECOV 1ST HR: Mod: PO | Performed by: ORTHOPAEDIC SURGERY

## 2024-10-22 PROCEDURE — 26055 INCISE FINGER TENDON SHEATH: CPT | Mod: F7,,, | Performed by: ORTHOPAEDIC SURGERY

## 2024-10-22 RX ORDER — CLINDAMYCIN PHOSPHATE 600 MG/50ML
600 INJECTION, SOLUTION INTRAVENOUS ONCE
Status: COMPLETED | OUTPATIENT
Start: 2024-10-22 | End: 2024-10-22

## 2024-10-22 RX ORDER — CLINDAMYCIN PHOSPHATE 150 MG/ML
600 INJECTION, SOLUTION INTRAVENOUS ONCE
Status: DISCONTINUED | OUTPATIENT
Start: 2024-10-22 | End: 2024-10-22

## 2024-10-22 RX ORDER — LIDOCAINE HYDROCHLORIDE 10 MG/ML
INJECTION, SOLUTION EPIDURAL; INFILTRATION; INTRACAUDAL; PERINEURAL
Status: DISCONTINUED | OUTPATIENT
Start: 2024-10-22 | End: 2024-10-22 | Stop reason: HOSPADM

## 2024-10-22 RX ORDER — IBUPROFEN 800 MG/1
800 TABLET ORAL EVERY 8 HOURS PRN
Qty: 10 TABLET | Refills: 0 | Status: SHIPPED | OUTPATIENT
Start: 2024-10-22

## 2024-10-22 RX ORDER — CEFAZOLIN SODIUM 1 G/3ML
2 INJECTION, POWDER, FOR SOLUTION INTRAMUSCULAR; INTRAVENOUS
Status: DISCONTINUED | OUTPATIENT
Start: 2024-10-22 | End: 2024-10-22

## 2024-10-22 RX ORDER — BUPIVACAINE HYDROCHLORIDE 2.5 MG/ML
INJECTION, SOLUTION EPIDURAL; INFILTRATION; INTRACAUDAL
Status: DISCONTINUED | OUTPATIENT
Start: 2024-10-22 | End: 2024-10-22 | Stop reason: HOSPADM

## 2024-10-22 RX ADMIN — CLINDAMYCIN PHOSPHATE 600 MG: 600 INJECTION, SOLUTION INTRAVENOUS at 07:10

## 2024-11-05 ENCOUNTER — OFFICE VISIT (OUTPATIENT)
Dept: ORTHOPEDICS | Facility: CLINIC | Age: 49
End: 2024-11-05
Payer: COMMERCIAL

## 2024-11-05 DIAGNOSIS — M65.331 TRIGGER FINGER, RIGHT MIDDLE FINGER: Primary | ICD-10-CM

## 2024-11-05 PROCEDURE — 99024 POSTOP FOLLOW-UP VISIT: CPT | Mod: S$GLB,,, | Performed by: PHYSICIAN ASSISTANT

## 2024-11-05 PROCEDURE — 1160F RVW MEDS BY RX/DR IN RCRD: CPT | Mod: CPTII,S$GLB,, | Performed by: PHYSICIAN ASSISTANT

## 2024-11-05 PROCEDURE — 99999 PR PBB SHADOW E&M-EST. PATIENT-LVL III: CPT | Mod: PBBFAC,,, | Performed by: PHYSICIAN ASSISTANT

## 2024-11-05 PROCEDURE — 1159F MED LIST DOCD IN RCRD: CPT | Mod: CPTII,S$GLB,, | Performed by: PHYSICIAN ASSISTANT

## 2024-11-05 RX ORDER — MELOXICAM 15 MG/1
15 TABLET ORAL DAILY
Qty: 28 TABLET | Refills: 0 | Status: SHIPPED | OUTPATIENT
Start: 2024-11-05

## 2024-11-05 NOTE — PROGRESS NOTES
Matty Aquino Sr. is a 49 y.o. male who presents to clinic for follow up status post right middle finger trigger release.  States over the past week or so he has noticed a return of the triggering of the finger.  States it was stiff and sore.  Denies any acute injuries.  States he has kept his dressing dry, clean, and intact as instructed.  Denies any other complaints at this time.    Physical Exam:  Examination of the right middle finger reveals minimal edema.  No erythema, ecchymosis, or fullness of the flexor tendon sheath.  Presence of mild tenderness to palpation overlying the area of the A2 and A1 pulleys, but no tenderness palpation throughout the remainder of the right middle finger/hand.  Presence of mild active catching of the right middle finger, but no full triggering.  Able make composite fist and fully extend all fingers.  Sensation is grossly intact in the radial, ulnar, and median nerve distributions.  Capillary refill is less than 2 seconds.    Radiology:  None.    Assessment:  Status post right middle finger trigger release    Plan:  1. I discussed with Matty Aquino Sr. that the best course of action this time is to remove his sutures in clinic today and place Steri-Strips.  We also discussed we would begin a course of oral anti-inflammatories via Mobic.  We did discuss to monitor the finger for any signs of worsening swelling, pain, or redness.  We did discuss for any of these signs to contact the clinic immediately or report to the nearest emergency department.  He verbally agreed with the treatment plan     2.  His surgical site was thoroughly cleaned with chlorhexidine.  His sutures removed and Steri-Strips were placed.      3. He was prescribed Mobic 15 mg to be taken once daily for the next 2 weeks.  He was instructed to discontinue medication for any adverse effects.  He was instructed to not take any other type of NSAIDs while taking this medication.  He verbalized  understanding.      4. I would like him follow up in clinic in 2 weeks for repeat evaluation.  He was instructed to contact the clinic for any problems or concerns in the interim.

## 2024-11-20 ENCOUNTER — OFFICE VISIT (OUTPATIENT)
Dept: ORTHOPEDICS | Facility: CLINIC | Age: 49
End: 2024-11-20
Payer: COMMERCIAL

## 2024-11-20 DIAGNOSIS — M65.331 TRIGGER FINGER, RIGHT MIDDLE FINGER: ICD-10-CM

## 2024-11-20 PROCEDURE — 99024 POSTOP FOLLOW-UP VISIT: CPT | Mod: S$GLB,,, | Performed by: ORTHOPAEDIC SURGERY

## 2024-11-20 PROCEDURE — 1159F MED LIST DOCD IN RCRD: CPT | Mod: CPTII,S$GLB,, | Performed by: ORTHOPAEDIC SURGERY

## 2024-11-20 PROCEDURE — 99999 PR PBB SHADOW E&M-EST. PATIENT-LVL II: CPT | Mod: PBBFAC,,, | Performed by: ORTHOPAEDIC SURGERY

## 2024-11-20 RX ORDER — MELOXICAM 15 MG/1
15 TABLET ORAL DAILY
Qty: 28 TABLET | Refills: 0 | Status: SHIPPED | OUTPATIENT
Start: 2024-11-20

## 2024-11-20 NOTE — PROGRESS NOTES
Mr Miles returns to clinic today.  Has a history of right middle finger A1 pulley release.  He was still having some triggering.  He was tried some Mobic.  He states that it has improved somewhat.  He was here today for further evaluation     Physical exam: Examination of the right hand middle finger reveals that the wound is now well healed.  There is mild edema.  He does have some scar tissue formation.  Flexion and extension reveals mild triggering at the A2 pulley.  He does have sensation intact over the tip.      Assessment: Right middle finger triggering     Plan:     1. I will extend his meloxicam     2.  He will begin working on scar massage     3.  He will begin a stretching protocol     4.  Will follow up 3 weeks.  If he was still having triggering at the A2 pulley I may discuss repeating a steroid injection versus discussion of the A2 pulley release

## 2024-12-13 ENCOUNTER — OFFICE VISIT (OUTPATIENT)
Dept: ORTHOPEDICS | Facility: CLINIC | Age: 49
End: 2024-12-13
Payer: COMMERCIAL

## 2024-12-13 VITALS
HEIGHT: 69 IN | DIASTOLIC BLOOD PRESSURE: 76 MMHG | SYSTOLIC BLOOD PRESSURE: 140 MMHG | WEIGHT: 170 LBS | HEART RATE: 63 BPM | BODY MASS INDEX: 25.18 KG/M2

## 2024-12-13 DIAGNOSIS — M65.331 TRIGGER FINGER, RIGHT MIDDLE FINGER: Primary | ICD-10-CM

## 2024-12-13 PROCEDURE — 99999 PR PBB SHADOW E&M-EST. PATIENT-LVL III: CPT | Mod: PBBFAC,,, | Performed by: ORTHOPAEDIC SURGERY

## 2024-12-13 RX ORDER — TRIAMCINOLONE ACETONIDE 40 MG/ML
40 INJECTION, SUSPENSION INTRA-ARTICULAR; INTRAMUSCULAR
Status: DISCONTINUED | OUTPATIENT
Start: 2024-12-13 | End: 2024-12-13 | Stop reason: HOSPADM

## 2024-12-13 RX ADMIN — TRIAMCINOLONE ACETONIDE 40 MG: 40 INJECTION, SUSPENSION INTRA-ARTICULAR; INTRAMUSCULAR at 02:12

## 2024-12-13 NOTE — PROGRESS NOTES
Mr Miles returns to clinic today.  Has a history of right middle finger triggering.  He underwent a trigger finger release.  He has had a recurrence of the triggering.  We feel like it is most likely at his A2 pulley and cause as     Physical exam: Examination of the right hand middle finger reveals that the incision in the palm is well healed.  There are no major other skin changes.  He does have mild edema about the finger.  He does have tenderness over the region of the A2 pulley.  A1 pulley he is not significantly tender.  Flexion and extension does reveal mild triggering.  He does have sensation intact at the tip     Assessment: Right middle finger triggering at the A2 pulley     Plan:     1.  We have discussed treatment options.  I did discuss release of the A2 pulley versus attempt at steroid injection.  After discussion of the risks and benefits we will proceed with a injection to the right middle finger flexor tendon sheath    2.  He will follow up in 6 weeks for repeat evaluation

## 2024-12-13 NOTE — PROCEDURES
Subjective:     History of Present Illness  The patient is a 61-year-old female seen in follow-up for elevated liver function test.     She remains on B12 supplement regimen, currently takes otc supplement.     She expresses uncertainty regarding the efficacy of doxepin that she is using for sleep.  She has also been using , excedrin w/tyl also for sleep.  She was previously taking Tylenol PM in conjunction with doxepin, but discontinued its use due to its acetaminophen content.     Her sleep is reported to be 2.5 hours per night. She is currently seeking employment due to significant stress at current job.  She feels that is effecting her sleep. She has previously tried trazodone 50 mg, but found it to be insufficient. She has previously tried Lunesta and Ambien, but found them ineffective. She has not tried doxylamine.     She was previously seen w/lab that showed elevated lft's.  She was told to stop lipitor, tyl and all ETOH.  She continues to drink ETOH daily.  She has gained 10 pounds but reports a lack of appetite. She consumes a lg glass of wine daily. She has been off Lipitor for her chol since last appt.  She is not on healthy diet.  She is also not exercising regularly.      Results    Imaging  Ultrasound shows liver enlarged and hepatic steatosis.    HFP shows alk phos up from 101 in 4/24; 154 in 5/24.  Now up to 174.  SGOT up from 48 in april;, 144 in may.  Now up to 242.  SGPT was was 26 and normal in april.  Then up to 60 in may.  Has sl improved and down to 53 currently.  T.bili was <0.2 in april, then 2.0 in may.  Now back down to 0.5.    Current medicines (including changes today)  Current Outpatient Medications   Medication Sig Dispense Refill    omeprazole (PRILOSEC) 20 MG delayed-release capsule Take 1 Capsule by mouth every day. 90 Capsule 3    lisinopril (PRINIVIL) 40 MG tablet Take 1 Tablet by mouth every day. 90 Tablet 3    hydroCHLOROthiazide 25 MG Tab Take 1 Tablet by mouth every day. 90  Tendon Sheath    Date/Time: 12/13/2024 2:20 PM    Performed by: Devin Cross MD  Authorized by: Dvein Cross MD    Consent Done?:  Yes (Verbal)  Indications:  Pain  Site marked: the procedure site was marked    Timeout: prior to procedure the correct patient, procedure, and site was verified    Prep: patient was prepped and draped in usual sterile fashion      Local anesthetic:  Lidocaine 1% without epinephrine  Anesthetic total (ml):  0.5    Location:  Long finger  Site:  R long flexor tendon sheath  Needle size:  25 G  Medications:  40 mg triamcinolone acetonide 40 mg/mL (20mg injected)  Patient tolerance:  Patient tolerated the procedure well with no immediate complications     Tablet 3    vitamin D2, Ergocalciferol, (DRISDOL) 1.25 MG (11154 UT) Cap capsule Take 1 Capsule by mouth every 7 days. 12 Capsule 0     No current facility-administered medications for this visit.     She  has a past medical history of Anxiety, Arthritis, DDD (degenerative disc disease), lumbar (02/07/2024), Depression, Diverticulosis, GERD with esophagitis, Hiatus hernia syndrome, Hyperlipidemia LDL goal <100 (02/07/2024), LBP (low back pain) (09/07/2012), Migraine without aura, with intractable migraine, so stated, without mention of status migrainosus, Obesity, Peripheral neuropathy, Primary hypertension (02/07/2024), Thoracic or lumbosacral neuritis or radiculitis, unspecified, and Vitamin D deficiency (02/07/2024).    She has no past medical history of Breast cancer (HCC).    Complete Metabolic Panel:  Lab Results   Component Value Date/Time    SODIUM 141 05/17/2024 01:15 PM    POTASSIUM 4.4 05/17/2024 01:15 PM    CHLORIDE 101 05/17/2024 01:15 PM    CO2 23 05/17/2024 01:15 PM    ANION 17.0 (H) 05/17/2024 01:15 PM    GLUCOSE 96 05/17/2024 01:15 PM    BUN 13 05/17/2024 01:15 PM    CREATININE 0.99 05/17/2024 01:15 PM    CREATININE 0.93 04/11/2011 08:15 AM    ASTSGOT 242 (H) 06/08/2024 12:15 PM    ALTSGPT 53 (H) 06/08/2024 12:15 PM    TBILIRUBIN 0.5 06/08/2024 12:15 PM    ALBUMIN 3.9 06/08/2024 12:15 PM    TOTPROTEIN 5.9 (L) 06/08/2024 12:15 PM    GLOBULIN 2.1 05/17/2024 01:15 PM    AGRATIO 2.0 05/17/2024 01:15 PM         GFR:    Lab Results   Component Value Date/Time    GFRCKD 66 05/17/2024 1315           ROS   Review of Systems   Constitutional: Negative.  Negative for fever, chills, weight loss, malaise/fatigue and diaphoresis.   HENT: Negative.  Negative for hearing loss, ear pain, nosebleeds, congestion, sore throat, neck pain, tinnitus and ear discharge.    Respiratory: Negative.  Negative for cough, hemoptysis, sputum production, shortness of breath, wheezing and stridor.    Cardiovascular: Negative.   "Negative for chest pain, palpitations, orthopnea, claudication, leg swelling and PND.   Gastrointestinal: denies nausea, vomiting, diarrhea, constipation, heartburn, melena or hematochezia.  Genitourinary: Denies dysuria, hematuria, urinary incontinence, frequency or urgency.    Musculoskeletal: Negative.  Negative for myalgias and back pain.   Neurological: Negative.  Negative for dizziness, tingling, tremors, weakness and headaches.   Psych:  Denies depression, anxiety or insomnia.  All other systems reviewed and are negative.         Objective:     /80 (BP Location: Left arm, Patient Position: Sitting, BP Cuff Size: Large adult)   Pulse 94   Temp 36.7 °C (98 °F) (Temporal)   Ht 1.651 m (5' 5\")   Wt 90.5 kg (199 lb 8 oz)   SpO2 94%  Body mass index is 33.2 kg/m².   Physical Exam    Physical Exam   Vitals reviewed.  Constitutional: oriented to person, place, and time. appears well-developed and well-nourished. No distress.   Neck: No JVD present.  Cardiovascular: Normal rate, regular rhythm, normal heart sounds and intact distal pulses.  Exam reveals no gallop and no friction rub.  No murmur heard.  No carotid bruits.   Pulmonary/Chest: Effort normal and breath sounds normal. No stridor. No respiratory distress. no wheezes or rales. exhibits no tenderness.   Musculoskeletal: Normal range of motion. exhibits no edema. jennifer pedal pulses 2+.  Lymphadenopathy: no cervical or supraclavicular adenopathy.   Abd:  No CVAT,  Soft,  Bs noted in all quadrants.  No HSM.  No abdominal tenderness.  Neurological: alert and oriented to person, place, and time. exhibits normal muscle tone. Coordination normal.   Skin: Skin is warm and dry. no diaphoresis.   Psychiatric: normal mood and affect. behavior is normal.           Assessment and Plan:   The following treatment plan was discussed      Assessment & Plan  1. Elevated liver function test, hepatomegaly & hepatitic steatosis.  HFP shows significant worsen of LFT's " w/SGOT, ALK PHOS.  An ultrasound revealed liver enlargement and hepatic steatosis. A HFP will be repeated in 3 months. A referral to Gastroenterology has been UPDATED to include elevated lfts' also.  Strongly enc pt to stop all ETOH intake.  Recommended she f/u if having DT's.  She states that she has stopped before w/o issue.     2. Insomnia.  Doxepin will be discontinued. Doxylamine will be trialed. If doxylamine proves ineffective, Remeron will be considered.    Follow-up  A follow-up appointment is scheduled for 3 months from now.      ORDERS:  1. Primary insomnia      try otc doxalamine. off doxpin d/t elevated lft's. consider remeron if other otc tx not helping.      2. Elevated LFTs  Referral to Gastroenterology    HEPATIC FUNCTION PANEL    stay off all etoh, tyl. repeat HFP 3 mo. f/u for review.  updated GI referral to be seen for abn lft's also.      3. Hepatomegaly  Referral to Gastroenterology    HEPATIC FUNCTION PANEL    RUQ us shows enlarged liver and hepatitic steatosis.  f/u w/GI      4. Hepatic steatosis  Referral to Gastroenterology    HEPATIC FUNCTION PANEL                Please note that this dictation was created using voice recognition software. I have made every reasonable attempt to correct obvious errors, but I expect that there are errors of grammar and possibly content that I did not discover before finalizing the note.      Attestation      Verbal consent was acquired by the patient to use [employer] ambient listening note generation during this visit Yes

## 2025-01-27 ENCOUNTER — OFFICE VISIT (OUTPATIENT)
Dept: ORTHOPEDICS | Facility: CLINIC | Age: 50
End: 2025-01-27
Payer: COMMERCIAL

## 2025-01-27 DIAGNOSIS — M65.331 TRIGGER FINGER, RIGHT MIDDLE FINGER: Primary | ICD-10-CM

## 2025-01-27 PROCEDURE — 99213 OFFICE O/P EST LOW 20 MIN: CPT | Mod: S$GLB,,, | Performed by: ORTHOPAEDIC SURGERY

## 2025-01-27 PROCEDURE — 99999 PR PBB SHADOW E&M-EST. PATIENT-LVL II: CPT | Mod: PBBFAC,,, | Performed by: ORTHOPAEDIC SURGERY

## 2025-01-27 PROCEDURE — 1159F MED LIST DOCD IN RCRD: CPT | Mod: CPTII,S$GLB,, | Performed by: ORTHOPAEDIC SURGERY

## 2025-01-27 NOTE — PROGRESS NOTES
Mr Aquino returns to clinic today.  Has a history of right middle finger triggering.  He did undergo a trigger release but has a secondary trigger.  We did injected.  He is now doing very well.  There was no further triggering noted.      Physical exam: Examination of the right hand middle finger reveals that there is no edema.  Palpation does not produce tenderness.  He is able make a full composite fist fully extend the finger without triggering or pain.      Assessment: Status post right middle finger trigger release     Plan:     1. We will continue to monitor.  If he has any recurrence of the trigger I will discuss further treatment     2.  Will follow up with me as needed

## 2025-04-21 ENCOUNTER — TELEPHONE (OUTPATIENT)
Dept: NEUROSURGERY | Facility: CLINIC | Age: 50
End: 2025-04-21
Payer: COMMERCIAL

## 2025-04-21 NOTE — TELEPHONE ENCOUNTER
LVM to let patient know we were calling in reference to referral place and to see if he would like to schedule appt.

## 2025-04-22 ENCOUNTER — TELEPHONE (OUTPATIENT)
Dept: NEUROSURGERY | Facility: CLINIC | Age: 50
End: 2025-04-22
Payer: COMMERCIAL

## 2025-04-22 NOTE — TELEPHONE ENCOUNTER
Spoke with pt and he states that he does not have time to bring the images to be uploaded into his chart prior to an appt because of his hectic work schedule. I also informed him that I could schedule the appt and he could plan to come an hour earlier and stop by [facility] for uploading. Pt states that he had previously spoken with ALICIA Cummins and she had informed him of the same information and it upset him so he hung up on her. Pt says he will have to decline an appt at this time.

## 2025-04-22 NOTE — TELEPHONE ENCOUNTER
----- Message from Sheyla sent at 4/22/2025 10:55 AM CDT -----  Regarding: Patient Returning Call  Contact: patient at [phone]  Type:  Patient Returning CallWho Called:  patient at [phone] Left Message for Patient:  Maria G Lock MADoes the patient know what this is regarding?:  yesAdditional Information:  Patient called back to schedule from referral. Patient informed that he had a disc with MRI from [facility]. When advised that we would like to have the images uploaded into the system before scheduling an appointment, he didn't like that and disconnected the call. Please call back to discuss. Thank you

## 2025-05-01 ENCOUNTER — OFFICE VISIT (OUTPATIENT)
Dept: NEUROSURGERY | Facility: CLINIC | Age: 50
End: 2025-05-01
Payer: COMMERCIAL

## 2025-05-01 ENCOUNTER — TELEPHONE (OUTPATIENT)
Dept: PAIN MEDICINE | Facility: CLINIC | Age: 50
End: 2025-05-01
Payer: COMMERCIAL

## 2025-05-01 VITALS
BODY MASS INDEX: 26.35 KG/M2 | WEIGHT: 177.94 LBS | HEART RATE: 59 BPM | HEIGHT: 69 IN | DIASTOLIC BLOOD PRESSURE: 85 MMHG | RESPIRATION RATE: 18 BRPM | SYSTOLIC BLOOD PRESSURE: 131 MMHG

## 2025-05-01 DIAGNOSIS — R20.2 NUMBNESS AND TINGLING IN LEFT ARM: ICD-10-CM

## 2025-05-01 DIAGNOSIS — R20.0 NUMBNESS AND TINGLING IN LEFT ARM: ICD-10-CM

## 2025-05-01 DIAGNOSIS — M48.02 CERVICAL STENOSIS OF SPINAL CANAL: ICD-10-CM

## 2025-05-01 PROCEDURE — 3079F DIAST BP 80-89 MM HG: CPT | Mod: CPTII,S$GLB,, | Performed by: STUDENT IN AN ORGANIZED HEALTH CARE EDUCATION/TRAINING PROGRAM

## 2025-05-01 PROCEDURE — 99205 OFFICE O/P NEW HI 60 MIN: CPT | Mod: S$GLB,,, | Performed by: STUDENT IN AN ORGANIZED HEALTH CARE EDUCATION/TRAINING PROGRAM

## 2025-05-01 PROCEDURE — 4010F ACE/ARB THERAPY RXD/TAKEN: CPT | Mod: CPTII,S$GLB,, | Performed by: STUDENT IN AN ORGANIZED HEALTH CARE EDUCATION/TRAINING PROGRAM

## 2025-05-01 PROCEDURE — 1159F MED LIST DOCD IN RCRD: CPT | Mod: CPTII,S$GLB,, | Performed by: STUDENT IN AN ORGANIZED HEALTH CARE EDUCATION/TRAINING PROGRAM

## 2025-05-01 PROCEDURE — 3075F SYST BP GE 130 - 139MM HG: CPT | Mod: CPTII,S$GLB,, | Performed by: STUDENT IN AN ORGANIZED HEALTH CARE EDUCATION/TRAINING PROGRAM

## 2025-05-01 PROCEDURE — 3008F BODY MASS INDEX DOCD: CPT | Mod: CPTII,S$GLB,, | Performed by: STUDENT IN AN ORGANIZED HEALTH CARE EDUCATION/TRAINING PROGRAM

## 2025-05-01 NOTE — TELEPHONE ENCOUNTER
Physician - Dr Cuevas    Type of Procedure/Injection - Cervical Epidural  C6-7           Laterality - NA      Priority - Normal      Anxiolysis- RNIV      Need to hold medication - Yes      Aspirin for 7 days      Clearance needed - No      Follow up - 3 week

## 2025-05-01 NOTE — H&P (VIEW-ONLY)
Neurosurgery History & Physical    Patient ID: Matty Aquino Sr. is a 49 y.o. male.    Chief Complaint   Patient presents with    Numbness     Patient present to clinic with c/o left arm numbness/tingling x 6-8 mos after picking up boxes in attic. Denies headaches.  Denies vision disturbances.        History of Present Illness:   49 year old male who presents today for evaluation of neck and left arm pain. He reports this began approx 6 months ago. After lifting boxes he noticed intense burning in his left tricep. This has progressed with numbness down the arm into the hand. He noticed some weakness when taking his shirt off a few months ago. Reports leaning head to the right side relieves symptoms temporarily. Since then he has had an REAL with Dr. Bryant with no improvement.     He cannot tolerate gabapentin. He takes 800mg ibuprofen PRN.     Review of Systems  Constitutional: no fever, chills or night sweats. No changes in weight   Eyes: no visual changes   ENT: no nasal congestion or sore throat   Respiratory: no cough or shortness of breath   Cardiovascular: no chest pain or palpitations   Gastrointestinal: no nausea or vomiting   Genitourinary: no hematuria or dysuria   Integument/Breast: no rash or pruritis   Hematologic/Lymphatic: no easy bruising or lymphadenopathy   Musculoskeletal: no arthralgias or myalgias.   Neurological: +neck, left arm numbness, weakness. no seizures or tremors   Behavioral/Psych: no auditory or visual hallucinations   Endocrine: no heat or cold intolerance     Past Medical History:   Diagnosis Date    Anxiety about health     Asthma     Cervical radiculopathy     DDD (degenerative disc disease), cervical     Hodgkin lymphoma     This is questionable diagnosis    Hypogonadism in male     Kidney stone     (R) x 2    Lung nodules     C4-7    Right lateral epicondylitis      Social History[1]  Family History   Problem Relation Name Age of Onset    No Known Problems Mother    "   No Known Problems Father       Review of patient's allergies indicates:   Allergen Reactions    Pcn [penicillins]      As a child     Current Medications[2]  Blood pressure 131/85, pulse (!) 59, resp. rate 18, height 5' 9" (1.753 m), weight 80.7 kg (177 lb 14.6 oz).      Neurological Exam  General: well developed, well nourished, no distress.   Neurologic: Alert and oriented. Thought content appropriate.  Cranial nerves: face symmetric, EOMI.   Motor Strength: Moves all extremities spontaneously with good tone. No abnormal movements seen.      Strength   Deltoids Triceps Biceps Wrist Extension Wrist Flexion Hand    Upper: R 5/5 5/5 5/5 5/5 5/5 5/5     L 5/5 4+/5 5/5 5/5 5/5 5/5       Iliopsoas Quadriceps Knee  Flexion Tibialis  anterior Gastro- cnemius EHL   Lower: R 5/5 5/5 5/5 5/5 5/5 5/5     L 5/5 5/5 5/5 5/5 5/5 5/5    +Spurlings, left.     Sensory: intact to light touch throughout  DTR's - 2+ and symmetric in UE and LE  Esqueda: absent  Clonus: absent  Pulm: Normal respiratory effort  Skin: Intact, no visible rashes or lesions     Imaging:   MRI cervical spine from 3/2025:  He has multilevel cervical spondylosis however at C6-7 there appears to be a proximally 4 mm left posterolateral disc herniation with mass effect, no cord compression but along the lateral recess and proximal foramen      Assessment & Plan:   Six-month of neck pain and left arm radiculopathy  Symptoms include pain left triceps weakness, burning  He has been managing this.  But notices more difficulty with simple lifting light weights  He attempted a REAL but feels like it was at L3-4 and states it was prior to his MRI.  I think he is symptomatic from a small disc herniation in the posterolateral space at C6-7 on the left there is component of mass effect.  We discussed options I think he is indicated for a cervical diskectomy at C6-7 with the goals of optimizing optimizing nerve recovery  Based on his x-rays he is options of a total " disc replacement or an ACDF we had reviewed the pros and cons    He would really like to avoid surgery we did discuss timing in an considerations of chronic nerve injury even with surgery in the future  He would really like to avoid  Attempt a C6-7 directed REAL, physical therapy evaluation  He was already referred for an EMG nerve conduction study but states can not get an appointment until mid July we placed an additional referral to see if it can be done sooner  I have him follow up in 4 weeks                         [1]   Social History  Socioeconomic History    Marital status:    Tobacco Use    Smoking status: Every Day     Current packs/day: 1.00     Average packs/day: 1 pack/day for 25.0 years (25.0 ttl pk-yrs)     Types: Cigarettes    Smokeless tobacco: Never   Substance and Sexual Activity    Alcohol use: No    Drug use: No     Social Drivers of Health     Financial Resource Strain: Low Risk  (9/30/2024)    Overall Financial Resource Strain (CARDIA)     Difficulty of Paying Living Expenses: Not hard at all   Food Insecurity: No Food Insecurity (9/30/2024)    Hunger Vital Sign     Worried About Running Out of Food in the Last Year: Never true     Ran Out of Food in the Last Year: Never true   Physical Activity: Unknown (9/30/2024)    Exercise Vital Sign     Days of Exercise per Week: Patient declined   Stress: Patient Declined (9/30/2024)    [employer] [place] [employer] - Occupational Stress Questionnaire     Feeling of Stress : Patient declined   Housing Stability: Unknown (9/30/2024)    Housing Stability Vital Sign     Unable to Pay for Housing in the Last Year: No   [2]   Current Outpatient Medications:     aspirin (ECOTRIN) 81 MG EC tablet, Take 81 mg by mouth once daily., Disp: , Rfl:     ibuprofen (ADVIL,MOTRIN) 800 MG tablet, Take 1 tablet (800 mg total) by mouth every 8 (eight) hours as needed for Pain., Disp: 10 tablet, Rfl: 0    losartan (COZAAR) 50 MG tablet, Take 50 mg by mouth  once daily., Disp: , Rfl:     metoprolol succinate (TOPROL-XL) 25 MG 24 hr tablet, Take 25 mg by mouth 3 (three) times daily., Disp: , Rfl:     rosuvastatin (CRESTOR) 20 MG tablet, Take 20 mg by mouth once daily., Disp: , Rfl:     testosterone cypionate (DEPOTESTOTERONE CYPIONATE) 200 mg/mL injection, INJECT 2 MLS INTRAMUSCULARLY EVERY TWO WEEKS, Disp: , Rfl:

## 2025-05-01 NOTE — PROGRESS NOTES
Neurosurgery History & Physical    Patient ID: Matty Aquino Sr. is a 49 y.o. male.    Chief Complaint   Patient presents with    Numbness     Patient present to clinic with c/o left arm numbness/tingling x 6-8 mos after picking up boxes in attic. Denies headaches.  Denies vision disturbances.        History of Present Illness:   49 year old male who presents today for evaluation of neck and left arm pain. He reports this began approx 6 months ago. After lifting boxes he noticed intense burning in his left tricep. This has progressed with numbness down the arm into the hand. He noticed some weakness when taking his shirt off a few months ago. Reports leaning head to the right side relieves symptoms temporarily. Since then he has had an REAL with Dr. Bryant with no improvement.     He cannot tolerate gabapentin. He takes 800mg ibuprofen PRN.     Review of Systems  Constitutional: no fever, chills or night sweats. No changes in weight   Eyes: no visual changes   ENT: no nasal congestion or sore throat   Respiratory: no cough or shortness of breath   Cardiovascular: no chest pain or palpitations   Gastrointestinal: no nausea or vomiting   Genitourinary: no hematuria or dysuria   Integument/Breast: no rash or pruritis   Hematologic/Lymphatic: no easy bruising or lymphadenopathy   Musculoskeletal: no arthralgias or myalgias.   Neurological: +neck, left arm numbness, weakness. no seizures or tremors   Behavioral/Psych: no auditory or visual hallucinations   Endocrine: no heat or cold intolerance     Past Medical History:   Diagnosis Date    Anxiety about health     Asthma     Cervical radiculopathy     DDD (degenerative disc disease), cervical     Hodgkin lymphoma     This is questionable diagnosis    Hypogonadism in male     Kidney stone     (R) x 2    Lung nodules     C4-7    Right lateral epicondylitis      Social History[1]  Family History   Problem Relation Name Age of Onset    No Known Problems Mother    "   No Known Problems Father       Review of patient's allergies indicates:   Allergen Reactions    Pcn [penicillins]      As a child     Current Medications[2]  Blood pressure 131/85, pulse (!) 59, resp. rate 18, height 5' 9" (1.753 m), weight 80.7 kg (177 lb 14.6 oz).      Neurological Exam  General: well developed, well nourished, no distress.   Neurologic: Alert and oriented. Thought content appropriate.  Cranial nerves: face symmetric, EOMI.   Motor Strength: Moves all extremities spontaneously with good tone. No abnormal movements seen.      Strength   Deltoids Triceps Biceps Wrist Extension Wrist Flexion Hand    Upper: R 5/5 5/5 5/5 5/5 5/5 5/5     L 5/5 4+/5 5/5 5/5 5/5 5/5       Iliopsoas Quadriceps Knee  Flexion Tibialis  anterior Gastro- cnemius EHL   Lower: R 5/5 5/5 5/5 5/5 5/5 5/5     L 5/5 5/5 5/5 5/5 5/5 5/5    +Spurlings, left.     Sensory: intact to light touch throughout  DTR's - 2+ and symmetric in UE and LE  Esqueda: absent  Clonus: absent  Pulm: Normal respiratory effort  Skin: Intact, no visible rashes or lesions     Imaging:   MRI cervical spine from 3/2025:  He has multilevel cervical spondylosis however at C6-7 there appears to be a proximally 4 mm left posterolateral disc herniation with mass effect, no cord compression but along the lateral recess and proximal foramen      Assessment & Plan:   Six-month of neck pain and left arm radiculopathy  Symptoms include pain left triceps weakness, burning  He has been managing this.  But notices more difficulty with simple lifting light weights  He attempted a REAL but feels like it was at L3-4 and states it was prior to his MRI.  I think he is symptomatic from a small disc herniation in the posterolateral space at C6-7 on the left there is component of mass effect.  We discussed options I think he is indicated for a cervical diskectomy at C6-7 with the goals of optimizing optimizing nerve recovery  Based on his x-rays he is options of a total " disc replacement or an ACDF we had reviewed the pros and cons    He would really like to avoid surgery we did discuss timing in an considerations of chronic nerve injury even with surgery in the future  He would really like to avoid  Attempt a C6-7 directed REAL, physical therapy evaluation  He was already referred for an EMG nerve conduction study but states can not get an appointment until mid July we placed an additional referral to see if it can be done sooner  I have him follow up in 4 weeks                         [1]   Social History  Socioeconomic History    Marital status:    Tobacco Use    Smoking status: Every Day     Current packs/day: 1.00     Average packs/day: 1 pack/day for 25.0 years (25.0 ttl pk-yrs)     Types: Cigarettes    Smokeless tobacco: Never   Substance and Sexual Activity    Alcohol use: No    Drug use: No     Social Drivers of Health     Financial Resource Strain: Low Risk  (9/30/2024)    Overall Financial Resource Strain (CARDIA)     Difficulty of Paying Living Expenses: Not hard at all   Food Insecurity: No Food Insecurity (9/30/2024)    Hunger Vital Sign     Worried About Running Out of Food in the Last Year: Never true     Ran Out of Food in the Last Year: Never true   Physical Activity: Unknown (9/30/2024)    Exercise Vital Sign     Days of Exercise per Week: Patient declined   Stress: Patient Declined (9/30/2024)    [employer] [place] [employer] - Occupational Stress Questionnaire     Feeling of Stress : Patient declined   Housing Stability: Unknown (9/30/2024)    Housing Stability Vital Sign     Unable to Pay for Housing in the Last Year: No   [2]   Current Outpatient Medications:     aspirin (ECOTRIN) 81 MG EC tablet, Take 81 mg by mouth once daily., Disp: , Rfl:     ibuprofen (ADVIL,MOTRIN) 800 MG tablet, Take 1 tablet (800 mg total) by mouth every 8 (eight) hours as needed for Pain., Disp: 10 tablet, Rfl: 0    losartan (COZAAR) 50 MG tablet, Take 50 mg by mouth  once daily., Disp: , Rfl:     metoprolol succinate (TOPROL-XL) 25 MG 24 hr tablet, Take 25 mg by mouth 3 (three) times daily., Disp: , Rfl:     rosuvastatin (CRESTOR) 20 MG tablet, Take 20 mg by mouth once daily., Disp: , Rfl:     testosterone cypionate (DEPOTESTOTERONE CYPIONATE) 200 mg/mL injection, INJECT 2 MLS INTRAMUSCULARLY EVERY TWO WEEKS, Disp: , Rfl:

## 2025-05-02 DIAGNOSIS — M54.12 CERVICAL RADICULOPATHY: Primary | ICD-10-CM

## 2025-05-02 RX ORDER — SODIUM CHLORIDE, SODIUM LACTATE, POTASSIUM CHLORIDE, CALCIUM CHLORIDE 600; 310; 30; 20 MG/100ML; MG/100ML; MG/100ML; MG/100ML
INJECTION, SOLUTION INTRAVENOUS CONTINUOUS
OUTPATIENT
Start: 2025-05-02

## 2025-05-02 NOTE — TELEPHONE ENCOUNTER
Spoke with patient and scheduled. Advised to hold ASA x 7 days prior. Pre op information given to patient and follow up appointment scheduled.

## 2025-05-13 ENCOUNTER — HOSPITAL ENCOUNTER (OUTPATIENT)
Dept: RADIOLOGY | Facility: HOSPITAL | Age: 50
Discharge: HOME | End: 2025-05-13
Attending: ANESTHESIOLOGY | Admitting: ANESTHESIOLOGY
Payer: COMMERCIAL

## 2025-05-13 ENCOUNTER — HOSPITAL ENCOUNTER (OUTPATIENT)
Facility: HOSPITAL | Age: 50
Discharge: HOME | End: 2025-05-13
Attending: ANESTHESIOLOGY | Admitting: ANESTHESIOLOGY
Payer: COMMERCIAL

## 2025-05-13 VITALS
SYSTOLIC BLOOD PRESSURE: 135 MMHG | WEIGHT: 165 LBS | OXYGEN SATURATION: 98 % | DIASTOLIC BLOOD PRESSURE: 81 MMHG | TEMPERATURE: 97 F | HEART RATE: 62 BPM | RESPIRATION RATE: 16 BRPM | BODY MASS INDEX: 24.44 KG/M2 | HEIGHT: 69 IN

## 2025-05-13 DIAGNOSIS — M54.2 NECK PAIN: ICD-10-CM

## 2025-05-13 DIAGNOSIS — M54.12 CERVICAL RADICULOPATHY: Primary | ICD-10-CM

## 2025-05-13 PROCEDURE — 62321 NJX INTERLAMINAR CRV/THRC: CPT | Mod: PO | Performed by: ANESTHESIOLOGY

## 2025-05-13 PROCEDURE — 25000003 PHARM REV CODE 250: Mod: PO | Performed by: ANESTHESIOLOGY

## 2025-05-13 PROCEDURE — A4216 STERILE WATER/SALINE, 10 ML: HCPCS | Mod: PO | Performed by: ANESTHESIOLOGY

## 2025-05-13 PROCEDURE — 25500020 PHARM REV CODE 255: Mod: PO | Performed by: ANESTHESIOLOGY

## 2025-05-13 PROCEDURE — 62321 NJX INTERLAMINAR CRV/THRC: CPT | Mod: ,,, | Performed by: ANESTHESIOLOGY

## 2025-05-13 PROCEDURE — 63600175 PHARM REV CODE 636 W HCPCS: Mod: PO | Performed by: ANESTHESIOLOGY

## 2025-05-13 RX ORDER — SODIUM CHLORIDE 9 MG/ML
INJECTION, SOLUTION INTRAMUSCULAR; INTRAVENOUS; SUBCUTANEOUS
Status: DISCONTINUED | OUTPATIENT
Start: 2025-05-13 | End: 2025-05-13 | Stop reason: HOSPADM

## 2025-05-13 RX ORDER — SODIUM CHLORIDE 9 MG/ML
INJECTION, SOLUTION INTRAVENOUS CONTINUOUS
Status: DISCONTINUED | OUTPATIENT
Start: 2025-05-13 | End: 2025-05-13 | Stop reason: HOSPADM

## 2025-05-13 RX ORDER — LIDOCAINE HYDROCHLORIDE 10 MG/ML
INJECTION, SOLUTION EPIDURAL; INFILTRATION; INTRACAUDAL; PERINEURAL
Status: DISCONTINUED | OUTPATIENT
Start: 2025-05-13 | End: 2025-05-13 | Stop reason: HOSPADM

## 2025-05-13 RX ORDER — DEXAMETHASONE SODIUM PHOSPHATE 10 MG/ML
INJECTION, SOLUTION INTRA-ARTICULAR; INTRALESIONAL; INTRAMUSCULAR; INTRAVENOUS; SOFT TISSUE
Status: DISCONTINUED | OUTPATIENT
Start: 2025-05-13 | End: 2025-05-13 | Stop reason: HOSPADM

## 2025-05-13 RX ORDER — SODIUM CHLORIDE, SODIUM LACTATE, POTASSIUM CHLORIDE, CALCIUM CHLORIDE 600; 310; 30; 20 MG/100ML; MG/100ML; MG/100ML; MG/100ML
INJECTION, SOLUTION INTRAVENOUS CONTINUOUS
Status: DISCONTINUED | OUTPATIENT
Start: 2025-05-13 | End: 2025-05-13

## 2025-05-13 RX ADMIN — SODIUM CHLORIDE: 9 INJECTION, SOLUTION INTRAVENOUS at 02:05

## 2025-05-13 NOTE — OP NOTE
"Procedure Note    Procedure Date: 5/13/2025    Procedure Performed:  C7-T1 cervical interlaminar epidural steroid injection under fluoroscopy.    Indications:  Matty Aquino Sr. presents with cervical radiculitis/radiculopathy secondary to disc herniation, osteophyte/osteophyte complexes, and/or severe degenerative disc disease producing foraminal or central spinal stenosis.  The pain has been present for at least 4 weeks and the patient has failed to respond to noninvasive conservative care.  Pain rated by NRS at baseline prior to intervention is 6/10.  Their radiculitis/radiculopathy and/or neurogenic claudication is severe enough to greatly impact their quality of life or function.      Pre-op diagnosis: Cervical Radiculitis/Radiculopathy    Post-op diagnosis: same    Physician: Sukhwinder Cuevas MD    IV anxiolysis medications: none    Medications injected: Dexamethasone 15mg, 3.5 mL sterile, preservative-free normal saline.    Local anesthetic used: 1% Lidocaine, 1 ml    Estimated Blood Loss: none    Complications:  none    Technique:  The patient was interviewed in the holding area and Risks/Benefits were discussed, including, but not limited to, the possibility of new or different pain, bleeding or infection.   All questions were answered.  The patient understood and accepted risks.  Consent was verfied.  A time-out was taken to identify patient and procedure prior to starting the procedure.  With the patient laying in a prone position with the neck in a mid-flexed forward position, the area was prepped and draped in the usual sterile fashion using ChloraPrep x2 and a fenestrated drape.  The area was determined under AP fluoroscopic guidance.  The skin and subcutaneous tissues overlying the targeted interspace were anesthetized with 3-5 mL of 1% Lidocaine using a 25G 1.5" needle.  A 20G, 3.5" Tuohy epidural needle was inserted through the anesthetized skin and directed toward the interspace under " fluoroscopic guidance until T1 lamina was contacted.  The fluoroscope was then adjusted to yield a contralateral oblique view of the C7-T1 interspace.  The epidural needle was incrementally walked cephalad off of the lamina until the ligamentum flavum was engaged. From this point, a loss-of-resistance technique was used to identify entrance of the needle into the epidural space.  Once the tip of the needle was in the desired position, the contrast dye Omnipaque was injected to determine placement and no vascular uptake.  Then, after negative aspiration, dexamethasone 15 mg + 3.5 cc NS was injected.  The needle was flushed with normal saline and removed. The contrast was seen to be displaced after injection. Patient was awake/responsive during all injections.  The patient tolerated the procedure well and was transferred to the [facility] in stable condition.  The patient was monitored after the procedure and was given post-procedure and discharge instructions to follow at home. The patient was discharged in a stable condition.

## 2025-05-13 NOTE — PLAN OF CARE
Plan of care discussed with patient. Procedure education provided. All questions and concerns answered. Patient verbalizes understanding.     [id number] Dr. Cuevas made aware that patient has been chewing gum for the last hour or so. MD okay to proceed with injection today.

## 2025-05-13 NOTE — INTERVAL H&P NOTE
The patient has been examined and the H&P has been reviewed:    I concur with the findings and no changes have occurred since H&P was written.  Referred for cervical REAL.  He has held aspirin appropriately. The risks and benefits of this intervention, and alternative therapies were discussed with the patient.  The discussion of risks included infection, bleeding, need for additional procedures or surgery, nerve damage.  Questions regarding the procedure, risks, expected outcome, and possible side effects were solicited and answered to the patient's satisfaction.  Ramon Aquino wishes to proceed with the injection or procedure.  Written consent was obtained.   ASA 2, MP II    There are no hospital problems to display for this patient.

## 2025-05-13 NOTE — DISCHARGE SUMMARY
[facility]  Discharge Note  Short Stay    Admit Date: 5/13/2025    Discharge Date: 5/13/2025    Attending Physician: Sukhwinder Cuevas     Discharge Provider: Sukhwinder Cuevas    Diagnoses:  There are no hospital problems to display for this patient.      Discharged Condition: Good    Final Diagnoses: Cervical radiculopathy [M54.12]    Disposition: Home or Self Care    Hospital Course: No complications, uneventful    Outcome of Hospitalization, Treatment, Procedure, or Surgery:  Patient was admitted for outpatient interventional pain management procedure. The patient tolerated the procedure well with no complications.    Follow up/Patient Instructions:  Follow up as scheduled in Pain Management office in 2-3 weeks.  Patient has received instructions and follow up date and time.    Medications:  Continue previous medications, except restart aspirin in 24 hours    Discharge Procedure Orders   Notify your health care provider if you experience any of the following:  temperature >100.4     Notify your health care provider if you experience any of the following:  persistent nausea and vomiting or diarrhea     Notify your health care provider if you experience any of the following:  severe uncontrolled pain     Notify your health care provider if you experience any of the following:  redness, tenderness, or signs of infection (pain, swelling, redness, odor or green/yellow discharge around incision site)     Notify your health care provider if you experience any of the following:  difficulty breathing or increased cough     Notify your health care provider if you experience any of the following:  severe persistent headache     Notify your health care provider if you experience any of the following:  worsening rash     Notify your health care provider if you experience any of the following:  persistent dizziness, light-headedness, or visual disturbances     Notify your health care provider if you experience any of the  following:  increased confusion or weakness     Activity as tolerated

## 2025-05-14 ENCOUNTER — PATIENT MESSAGE (OUTPATIENT)
Dept: ORTHOPEDICS | Facility: CLINIC | Age: 50
End: 2025-05-14
Payer: COMMERCIAL

## 2025-05-19 ENCOUNTER — TELEPHONE (OUTPATIENT)
Dept: ORTHOPEDICS | Facility: CLINIC | Age: 50
End: 2025-05-19
Payer: COMMERCIAL

## 2025-05-29 ENCOUNTER — OFFICE VISIT (OUTPATIENT)
Dept: NEUROSURGERY | Facility: CLINIC | Age: 50
End: 2025-05-29
Payer: COMMERCIAL

## 2025-05-29 ENCOUNTER — TELEPHONE (OUTPATIENT)
Dept: PAIN MEDICINE | Facility: CLINIC | Age: 50
End: 2025-05-29
Payer: COMMERCIAL

## 2025-05-29 VITALS
RESPIRATION RATE: 18 BRPM | HEIGHT: 69 IN | BODY MASS INDEX: 24.42 KG/M2 | DIASTOLIC BLOOD PRESSURE: 78 MMHG | SYSTOLIC BLOOD PRESSURE: 132 MMHG | WEIGHT: 164.88 LBS

## 2025-05-29 DIAGNOSIS — M48.02 CERVICAL STENOSIS OF SPINAL CANAL: Primary | ICD-10-CM

## 2025-05-29 PROCEDURE — 99213 OFFICE O/P EST LOW 20 MIN: CPT | Mod: S$GLB,,, | Performed by: STUDENT IN AN ORGANIZED HEALTH CARE EDUCATION/TRAINING PROGRAM

## 2025-05-29 PROCEDURE — 3008F BODY MASS INDEX DOCD: CPT | Mod: CPTII,S$GLB,, | Performed by: STUDENT IN AN ORGANIZED HEALTH CARE EDUCATION/TRAINING PROGRAM

## 2025-05-29 PROCEDURE — 3078F DIAST BP <80 MM HG: CPT | Mod: CPTII,S$GLB,, | Performed by: STUDENT IN AN ORGANIZED HEALTH CARE EDUCATION/TRAINING PROGRAM

## 2025-05-29 PROCEDURE — 4010F ACE/ARB THERAPY RXD/TAKEN: CPT | Mod: CPTII,S$GLB,, | Performed by: STUDENT IN AN ORGANIZED HEALTH CARE EDUCATION/TRAINING PROGRAM

## 2025-05-29 PROCEDURE — 3075F SYST BP GE 130 - 139MM HG: CPT | Mod: CPTII,S$GLB,, | Performed by: STUDENT IN AN ORGANIZED HEALTH CARE EDUCATION/TRAINING PROGRAM

## 2025-05-29 PROCEDURE — 1159F MED LIST DOCD IN RCRD: CPT | Mod: CPTII,S$GLB,, | Performed by: STUDENT IN AN ORGANIZED HEALTH CARE EDUCATION/TRAINING PROGRAM

## 2025-05-29 NOTE — PROGRESS NOTES
Neurosurgery History & Physical    Patient ID: Matty Aquino Sr. is a 50 y.o. male.    No chief complaint on file.    Follows up today status post REAL he had immediate benefitted felt this has in the right spot  Decreased the radicular symptoms  But has persistence  Has a follow up with pain management    History of Present Illness:   49 year old male who presents today for evaluation of neck and left arm pain. He reports this began approx 6 months ago. After lifting boxes he noticed intense burning in his left tricep. This has progressed with numbness down the arm into the hand. He noticed some weakness when taking his shirt off a few months ago. Reports leaning head to the right side relieves symptoms temporarily. Since then he has had an REAL with Dr. Bryant with no improvement.     He cannot tolerate gabapentin. He takes 800mg ibuprofen PRN.     Review of Systems  Constitutional: no fever, chills or night sweats. No changes in weight   Eyes: no visual changes   ENT: no nasal congestion or sore throat   Respiratory: no cough or shortness of breath   Cardiovascular: no chest pain or palpitations   Gastrointestinal: no nausea or vomiting   Genitourinary: no hematuria or dysuria   Integument/Breast: no rash or pruritis   Hematologic/Lymphatic: no easy bruising or lymphadenopathy   Musculoskeletal: no arthralgias or myalgias.   Neurological: +neck, left arm numbness, weakness. no seizures or tremors   Behavioral/Psych: no auditory or visual hallucinations   Endocrine: no heat or cold intolerance     Past Medical History:   Diagnosis Date    Anxiety about health     Asthma     Cervical radiculopathy     DDD (degenerative disc disease), cervical     Hodgkin lymphoma     This is questionable diagnosis    Hypogonadism in male     Kidney stone     (R) x 2    Lung nodules     C4-7    Right lateral epicondylitis      Social History[1]  Family History   Problem Relation Name Age of Onset    No Known Problems  "Mother      No Known Problems Father       Review of patient's allergies indicates:   Allergen Reactions    Pcn [penicillins]      As a child    Ciprofloxacin Nausea And Vomiting     Current Medications[2]  Blood pressure 132/78, resp. rate 18, height 5' 9" (1.753 m), weight 74.8 kg (164 lb 14.5 oz).      Neurological Exam  General: well developed, well nourished, no distress.   Neurologic: Alert and oriented. Thought content appropriate.  Cranial nerves: face symmetric, EOMI.   Motor Strength: Moves all extremities spontaneously with good tone. No abnormal movements seen.      Strength   Deltoids Triceps Biceps Wrist Extension Wrist Flexion Hand    Upper: R 5/5 5/5 5/5 5/5 5/5 5/5     L 5/5 4+/5 5/5 5/5 5/5 5/5       Iliopsoas Quadriceps Knee  Flexion Tibialis  anterior Gastro- cnemius EHL   Lower: R 5/5 5/5 5/5 5/5 5/5 5/5     L 5/5 5/5 5/5 5/5 5/5 5/5    +Spurlings, left.     Sensory: intact to light touch throughout  DTR's - 2+ and symmetric in UE and LE  Esqueda: absent  Clonus: absent  Pulm: Normal respiratory effort  Skin: Intact, no visible rashes or lesions     Imaging:   MRI cervical spine from 3/2025:  He has multilevel cervical spondylosis however at C6-7 there appears to be a proximally 4 mm left posterolateral disc herniation with mass effect, no cord compression but along the lateral recess and proximal foramen      Assessment & Plan:   Six-month of neck pain and left arm radiculopathy  Symptoms include pain left triceps weakness, burning  He has been managing this.  But notices more difficulty with simple lifting light weights  He attempted a REAL but feels like it was at L3-4 and states it was prior to his MRI.  I think he is symptomatic from a small disc herniation in the posterolateral space at C6-7 on the left there is component of mass effect.  We discussed options I think he is indicated for a cervical diskectomy at C6-7 with the goals of optimizing optimizing nerve recovery  Based on his " x-rays he is options of a total disc replacement or an ACDF we had reviewed the pros and cons    He would really like to avoid surgery we did discuss timing in an considerations of chronic nerve injury even with surgery in the future  He would really like to avoid  Attempt a C6-7 directed REAL, physical therapy evaluation  He was already referred for an EMG nerve conduction study but states can not get an appointment until mid July we placed an additional referral to see if it can be done sooner  I have him follow up in 4 weeks    Assessment recommendation  Status post REAL had benefit  Follow up with pain management considering it is worth another  Also given a new referral to physical therapy for his neck he would like to attempt conservative management CV and escalate his activity  Over the next 6-12 weeks                       [1]   Social History  Socioeconomic History    Marital status:    Tobacco Use    Smoking status: Every Day     Current packs/day: 1.00     Average packs/day: 1 pack/day for 25.0 years (25.0 ttl pk-yrs)     Types: Cigarettes    Smokeless tobacco: Never   Substance and Sexual Activity    Alcohol use: No    Drug use: No     Social Drivers of Health     Financial Resource Strain: Low Risk  (9/30/2024)    Overall Financial Resource Strain (CARDIA)     Difficulty of Paying Living Expenses: Not hard at all   Food Insecurity: No Food Insecurity (9/30/2024)    Hunger Vital Sign     Worried About Running Out of Food in the Last Year: Never true     Ran Out of Food in the Last Year: Never true   Physical Activity: Unknown (9/30/2024)    Exercise Vital Sign     Days of Exercise per Week: Patient declined   Stress: Patient Declined (9/30/2024)    [employer] - Occupational Stress Questionnaire     Feeling of Stress : Patient declined   Housing Stability: Unknown (9/30/2024)    Housing Stability Vital Sign     Unable to Pay for Housing in the Last Year: No   [2]    Current Outpatient Medications:     aspirin (ECOTRIN) 81 MG EC tablet, Take 81 mg by mouth once daily., Disp: , Rfl:     ibuprofen (ADVIL,MOTRIN) 800 MG tablet, Take 1 tablet (800 mg total) by mouth every 8 (eight) hours as needed for Pain., Disp: 10 tablet, Rfl: 0    losartan (COZAAR) 50 MG tablet, Take 50 mg by mouth once daily., Disp: , Rfl:     metoprolol succinate (TOPROL-XL) 25 MG 24 hr tablet, Take 25 mg by mouth 3 (three) times daily., Disp: , Rfl:     rosuvastatin (CRESTOR) 20 MG tablet, Take 20 mg by mouth once daily., Disp: , Rfl:     testosterone cypionate (DEPOTESTOTERONE CYPIONATE) 200 mg/mL injection, INJECT 2 MLS INTRAMUSCULARLY EVERY TWO WEEKS, Disp: , Rfl:

## 2025-05-29 NOTE — TELEPHONE ENCOUNTER
----- Message from Nurse Conchita sent at 5/29/2025  9:23 AM CDT -----  Regarding: REAL Follow up  Good morning,This patient was just seen by Dr. Olvera. Dr. Olvera would like patient to follow back up with Dr. Cuevas s/p REAL. Please let me know if there is anything I can do to help.Thank you,Conchita

## 2025-06-03 ENCOUNTER — OFFICE VISIT (OUTPATIENT)
Dept: PAIN MEDICINE | Facility: CLINIC | Age: 50
End: 2025-06-03
Payer: COMMERCIAL

## 2025-06-03 VITALS
SYSTOLIC BLOOD PRESSURE: 112 MMHG | HEART RATE: 59 BPM | BODY MASS INDEX: 25.1 KG/M2 | DIASTOLIC BLOOD PRESSURE: 71 MMHG | WEIGHT: 170 LBS

## 2025-06-03 DIAGNOSIS — M54.12 CERVICAL RADICULOPATHY: Primary | ICD-10-CM

## 2025-06-03 DIAGNOSIS — R20.2 NUMBNESS AND TINGLING IN LEFT ARM: ICD-10-CM

## 2025-06-03 DIAGNOSIS — M48.02 CERVICAL STENOSIS OF SPINAL CANAL: ICD-10-CM

## 2025-06-03 DIAGNOSIS — R20.0 NUMBNESS AND TINGLING IN LEFT ARM: ICD-10-CM

## 2025-06-03 PROCEDURE — 4010F ACE/ARB THERAPY RXD/TAKEN: CPT | Mod: CPTII,S$GLB,, | Performed by: ANESTHESIOLOGY

## 2025-06-03 PROCEDURE — 3074F SYST BP LT 130 MM HG: CPT | Mod: CPTII,S$GLB,, | Performed by: ANESTHESIOLOGY

## 2025-06-03 PROCEDURE — 3008F BODY MASS INDEX DOCD: CPT | Mod: CPTII,S$GLB,, | Performed by: ANESTHESIOLOGY

## 2025-06-03 PROCEDURE — 99999 PR PBB SHADOW E&M-EST. PATIENT-LVL IV: CPT | Mod: PBBFAC,,, | Performed by: ANESTHESIOLOGY

## 2025-06-03 PROCEDURE — 3078F DIAST BP <80 MM HG: CPT | Mod: CPTII,S$GLB,, | Performed by: ANESTHESIOLOGY

## 2025-06-03 PROCEDURE — 99204 OFFICE O/P NEW MOD 45 MIN: CPT | Mod: S$GLB,,, | Performed by: ANESTHESIOLOGY

## 2025-06-03 PROCEDURE — 1159F MED LIST DOCD IN RCRD: CPT | Mod: CPTII,S$GLB,, | Performed by: ANESTHESIOLOGY

## 2025-06-24 ENCOUNTER — TELEPHONE (OUTPATIENT)
Dept: NEUROLOGY | Facility: CLINIC | Age: 50
End: 2025-06-24
Payer: COMMERCIAL

## 2025-07-07 ENCOUNTER — TELEPHONE (OUTPATIENT)
Dept: NEUROLOGY | Facility: CLINIC | Age: 50
End: 2025-07-07
Payer: COMMERCIAL

## (undated) DEVICE — TRAY NERVE BLOCK

## (undated) DEVICE — STRAP OR TABLE 5IN X 72IN

## (undated) DEVICE — APPLICATOR CHLORAPREP CLR 10.5

## (undated) DEVICE — DRAPE THREE-QTR REINF 53X77IN

## (undated) DEVICE — SYR GLASS 5CC LUER LOK

## (undated) DEVICE — SOL IRR 0.9% NACL 500ML PB

## (undated) DEVICE — DRESSING XEROFORM NONADH 1X8IN

## (undated) DEVICE — BANDAGE ELAS SOFTWRAP ST 3X5YD

## (undated) DEVICE — BOWL STERILE LARGE 32OZ

## (undated) DEVICE — FORCEP STRAIGHT DISP

## (undated) DEVICE — ALCOHOL 70% ISOP RUBBING 4OZ

## (undated) DEVICE — NDL 27G X 1 1/4

## (undated) DEVICE — TOURNIQUET SB QC DP 18X4IN

## (undated) DEVICE — HANDLE SURG LIGHT NONRIGID

## (undated) DEVICE — DRAPE HALF SURGICAL 40X58IN

## (undated) DEVICE — TOWEL OR DISP STRL BLUE 4/PK

## (undated) DEVICE — CORD BIPOLAR 12 FOOT

## (undated) DEVICE — GLOVE 7.5 PROTEXIS PI MICRO

## (undated) DEVICE — SPONGE COTTON TRAY 4X4IN

## (undated) DEVICE — APPLICATOR CHLORAPREP ORN 26ML

## (undated) DEVICE — DRAPE STERI-DRAPE 1000 17X11IN

## (undated) DEVICE — GLOVE SENSICARE PI ALOE 8

## (undated) DEVICE — BAND RUBBER STERILE 1/4X3.5IN

## (undated) DEVICE — SYR 10CC LUER LOCK

## (undated) DEVICE — Device

## (undated) DEVICE — DRAPE U SPLIT SHEET 54X76IN

## (undated) DEVICE — KIT SAHARA DRAPE DRAW/LIFT

## (undated) DEVICE — BLADE SURG #15 CARBON STEEL

## (undated) DEVICE — GLOVE PROTEXIS LTX MICRO  7.5

## (undated) DEVICE — BANDAGE ESMARK LATEX FREE 4INX

## (undated) DEVICE — SUT ETHILON 4-0 PS2 18 BLK

## (undated) DEVICE — GLOVE PROTEXIS LTX MICRO 8

## (undated) DEVICE — PAD CAST SPECIALIST STRL 3

## (undated) DEVICE — DRAPE HAND STERILE